# Patient Record
Sex: FEMALE | Race: WHITE | Employment: FULL TIME | URBAN - METROPOLITAN AREA
[De-identification: names, ages, dates, MRNs, and addresses within clinical notes are randomized per-mention and may not be internally consistent; named-entity substitution may affect disease eponyms.]

---

## 2017-05-17 PROBLEM — Z98.84 HX OF BARIATRIC SURGERY: Status: ACTIVE | Noted: 2017-05-17

## 2017-05-17 PROBLEM — R06.02 SHORTNESS OF BREATH: Status: ACTIVE | Noted: 2017-05-17

## 2017-05-17 PROBLEM — Z72.0 TOBACCO ABUSE: Status: ACTIVE | Noted: 2017-05-17

## 2018-01-29 PROBLEM — F17.200 TOBACCO USE DISORDER: Status: ACTIVE | Noted: 2018-01-29

## 2020-12-27 ENCOUNTER — APPOINTMENT (OUTPATIENT)
Dept: GENERAL RADIOLOGY | Age: 50
DRG: 184 | End: 2020-12-27
Payer: COMMERCIAL

## 2020-12-27 ENCOUNTER — HOSPITAL ENCOUNTER (INPATIENT)
Age: 50
LOS: 7 days | Discharge: HOME | DRG: 184 | End: 2021-01-03
Attending: EMERGENCY MEDICINE | Admitting: SURGERY
Payer: COMMERCIAL

## 2020-12-27 ENCOUNTER — APPOINTMENT (OUTPATIENT)
Dept: CT IMAGING | Age: 50
DRG: 184 | End: 2020-12-27
Payer: COMMERCIAL

## 2020-12-27 DIAGNOSIS — S22.49XA TRAUMATIC CLOSED DISPLACED FRACTURE OF MULTIPLE RIBS: ICD-10-CM

## 2020-12-27 DIAGNOSIS — S22.41XA CLOSED FRACTURE OF MULTIPLE RIBS OF RIGHT SIDE, INITIAL ENCOUNTER: ICD-10-CM

## 2020-12-27 DIAGNOSIS — T14.90XA TRAUMA: Primary | ICD-10-CM

## 2020-12-27 LAB
ALBUMIN SERPL-MCNC: 4.5 G/DL (ref 3.5–5.2)
ALP BLD-CCNC: 86 U/L (ref 35–104)
ALT SERPL-CCNC: 21 U/L (ref 0–32)
ANION GAP SERPL CALCULATED.3IONS-SCNC: 9 MMOL/L (ref 7–16)
AST SERPL-CCNC: 31 U/L (ref 0–31)
BILIRUB SERPL-MCNC: 0.2 MG/DL (ref 0–1.2)
BUN BLDV-MCNC: 14 MG/DL (ref 6–20)
CALCIUM SERPL-MCNC: 9.4 MG/DL (ref 8.6–10.2)
CHLORIDE BLD-SCNC: 103 MMOL/L (ref 98–107)
CO2: 29 MMOL/L (ref 22–29)
CREAT SERPL-MCNC: 0.9 MG/DL (ref 0.5–1)
GFR AFRICAN AMERICAN: >60
GFR NON-AFRICAN AMERICAN: >60 ML/MIN/1.73
GLUCOSE BLD-MCNC: 97 MG/DL (ref 74–99)
POTASSIUM REFLEX MAGNESIUM: 4.2 MMOL/L (ref 3.5–5)
SODIUM BLD-SCNC: 141 MMOL/L (ref 132–146)
TOTAL PROTEIN: 6.9 G/DL (ref 6.4–8.3)

## 2020-12-27 PROCEDURE — 6360000002 HC RX W HCPCS: Performed by: SURGERY

## 2020-12-27 PROCEDURE — 6370000000 HC RX 637 (ALT 250 FOR IP): Performed by: EMERGENCY MEDICINE

## 2020-12-27 PROCEDURE — 70450 CT HEAD/BRAIN W/O DYE: CPT

## 2020-12-27 PROCEDURE — 71260 CT THORAX DX C+: CPT

## 2020-12-27 PROCEDURE — 6370000000 HC RX 637 (ALT 250 FOR IP): Performed by: STUDENT IN AN ORGANIZED HEALTH CARE EDUCATION/TRAINING PROGRAM

## 2020-12-27 PROCEDURE — 2000000000 HC ICU R&B

## 2020-12-27 PROCEDURE — 72125 CT NECK SPINE W/O DYE: CPT

## 2020-12-27 PROCEDURE — 72170 X-RAY EXAM OF PELVIS: CPT

## 2020-12-27 PROCEDURE — 96374 THER/PROPH/DIAG INJ IV PUSH: CPT

## 2020-12-27 PROCEDURE — 80053 COMPREHEN METABOLIC PANEL: CPT

## 2020-12-27 PROCEDURE — 6360000002 HC RX W HCPCS: Performed by: EMERGENCY MEDICINE

## 2020-12-27 PROCEDURE — 6360000004 HC RX CONTRAST MEDICATION: Performed by: RADIOLOGY

## 2020-12-27 PROCEDURE — 99223 1ST HOSP IP/OBS HIGH 75: CPT | Performed by: SURGERY

## 2020-12-27 PROCEDURE — 99285 EMERGENCY DEPT VISIT HI MDM: CPT

## 2020-12-27 PROCEDURE — 71045 X-RAY EXAM CHEST 1 VIEW: CPT

## 2020-12-27 PROCEDURE — U0002 COVID-19 LAB TEST NON-CDC: HCPCS

## 2020-12-27 PROCEDURE — 74177 CT ABD & PELVIS W/CONTRAST: CPT

## 2020-12-27 PROCEDURE — 2580000003 HC RX 258: Performed by: RADIOLOGY

## 2020-12-27 RX ORDER — METHOCARBAMOL 500 MG/1
1500 TABLET, FILM COATED ORAL ONCE
Status: COMPLETED | OUTPATIENT
Start: 2020-12-27 | End: 2020-12-27

## 2020-12-27 RX ORDER — 0.9 % SODIUM CHLORIDE 0.9 %
500 INTRAVENOUS SOLUTION INTRAVENOUS ONCE
Status: DISCONTINUED | OUTPATIENT
Start: 2020-12-27 | End: 2020-12-30

## 2020-12-27 RX ORDER — SODIUM CHLORIDE 0.9 % (FLUSH) 0.9 %
10 SYRINGE (ML) INJECTION PRN
Status: COMPLETED | OUTPATIENT
Start: 2020-12-27 | End: 2020-12-27

## 2020-12-27 RX ORDER — OXYCODONE HYDROCHLORIDE 5 MG/1
5 TABLET ORAL EVERY 4 HOURS PRN
Status: DISCONTINUED | OUTPATIENT
Start: 2020-12-27 | End: 2020-12-28

## 2020-12-27 RX ORDER — OXYCODONE HYDROCHLORIDE 10 MG/1
10 TABLET ORAL EVERY 4 HOURS PRN
Status: DISCONTINUED | OUTPATIENT
Start: 2020-12-27 | End: 2020-12-28

## 2020-12-27 RX ORDER — FENTANYL CITRATE 50 UG/ML
100 INJECTION, SOLUTION INTRAMUSCULAR; INTRAVENOUS ONCE
Status: COMPLETED | OUTPATIENT
Start: 2020-12-27 | End: 2020-12-27

## 2020-12-27 RX ADMIN — IOPAMIDOL 100 ML: 755 INJECTION, SOLUTION INTRAVENOUS at 21:12

## 2020-12-27 RX ADMIN — HYDROMORPHONE HYDROCHLORIDE 1 MG: 1 INJECTION, SOLUTION INTRAMUSCULAR; INTRAVENOUS; SUBCUTANEOUS at 23:26

## 2020-12-27 RX ADMIN — OXYCODONE HYDROCHLORIDE 10 MG: 10 TABLET ORAL at 20:41

## 2020-12-27 RX ADMIN — METHOCARBAMOL 1500 MG: 500 TABLET ORAL at 17:08

## 2020-12-27 RX ADMIN — Medication 10 ML: at 21:13

## 2020-12-27 RX ADMIN — FENTANYL CITRATE 100 MCG: 0.05 INJECTION, SOLUTION INTRAMUSCULAR; INTRAVENOUS at 17:08

## 2020-12-27 ASSESSMENT — ENCOUNTER SYMPTOMS
BACK PAIN: 0
SHORTNESS OF BREATH: 0
SORE THROAT: 0
NAUSEA: 0
VOMITING: 0
ABDOMINAL PAIN: 0
EYE PAIN: 0

## 2020-12-27 ASSESSMENT — PAIN SCALES - GENERAL: PAINLEVEL_OUTOF10: 10

## 2020-12-27 NOTE — ED NOTES
Bed: 24  Expected date: 12/27/20  Expected time:   Means of arrival: EMT Ambulance Service  Comments:  Aneta Hennessy Ma, RN  12/27/20 6618

## 2020-12-28 ENCOUNTER — ANESTHESIA (OUTPATIENT)
Dept: SURGICAL ICU | Age: 50
DRG: 184 | End: 2020-12-28
Payer: COMMERCIAL

## 2020-12-28 ENCOUNTER — ANESTHESIA EVENT (OUTPATIENT)
Dept: SURGICAL ICU | Age: 50
DRG: 184 | End: 2020-12-28
Payer: COMMERCIAL

## 2020-12-28 LAB
ALBUMIN SERPL-MCNC: 3.7 G/DL (ref 3.5–5.2)
ALP BLD-CCNC: 78 U/L (ref 35–104)
ALT SERPL-CCNC: 18 U/L (ref 0–32)
ANION GAP SERPL CALCULATED.3IONS-SCNC: 9 MMOL/L (ref 7–16)
AST SERPL-CCNC: 26 U/L (ref 0–31)
BILIRUB SERPL-MCNC: 0.3 MG/DL (ref 0–1.2)
BUN BLDV-MCNC: 13 MG/DL (ref 6–20)
CALCIUM IONIZED: 1.28 MMOL/L (ref 1.15–1.33)
CALCIUM SERPL-MCNC: 8.8 MG/DL (ref 8.6–10.2)
CHLORIDE BLD-SCNC: 104 MMOL/L (ref 98–107)
CO2: 25 MMOL/L (ref 22–29)
CREAT SERPL-MCNC: 0.8 MG/DL (ref 0.5–1)
GFR AFRICAN AMERICAN: >60
GFR NON-AFRICAN AMERICAN: >60 ML/MIN/1.73
GLUCOSE BLD-MCNC: 89 MG/DL (ref 74–99)
HCT VFR BLD CALC: 33.5 % (ref 34–48)
HEMOGLOBIN: 10.6 G/DL (ref 11.5–15.5)
MAGNESIUM: 2.1 MG/DL (ref 1.6–2.6)
MCH RBC QN AUTO: 28.4 PG (ref 26–35)
MCHC RBC AUTO-ENTMCNC: 31.6 % (ref 32–34.5)
MCV RBC AUTO: 89.8 FL (ref 80–99.9)
PDW BLD-RTO: 14.4 FL (ref 11.5–15)
PHOSPHORUS: 3.8 MG/DL (ref 2.5–4.5)
PLATELET # BLD: 279 E9/L (ref 130–450)
PMV BLD AUTO: 11 FL (ref 7–12)
POTASSIUM REFLEX MAGNESIUM: 4 MMOL/L (ref 3.5–5)
RBC # BLD: 3.73 E12/L (ref 3.5–5.5)
SARS-COV-2, NAAT: NOT DETECTED
SODIUM BLD-SCNC: 138 MMOL/L (ref 132–146)
TOTAL PROTEIN: 6.1 G/DL (ref 6.4–8.3)
WBC # BLD: 9.2 E9/L (ref 4.5–11.5)

## 2020-12-28 PROCEDURE — 85027 COMPLETE CBC AUTOMATED: CPT

## 2020-12-28 PROCEDURE — 97535 SELF CARE MNGMENT TRAINING: CPT

## 2020-12-28 PROCEDURE — 2500000003 HC RX 250 WO HCPCS: Performed by: ANESTHESIOLOGY

## 2020-12-28 PROCEDURE — 97166 OT EVAL MOD COMPLEX 45 MIN: CPT

## 2020-12-28 PROCEDURE — 82330 ASSAY OF CALCIUM: CPT

## 2020-12-28 PROCEDURE — 97162 PT EVAL MOD COMPLEX 30 MIN: CPT

## 2020-12-28 PROCEDURE — 6360000002 HC RX W HCPCS: Performed by: STUDENT IN AN ORGANIZED HEALTH CARE EDUCATION/TRAINING PROGRAM

## 2020-12-28 PROCEDURE — 83735 ASSAY OF MAGNESIUM: CPT

## 2020-12-28 PROCEDURE — 2580000003 HC RX 258: Performed by: ANESTHESIOLOGY

## 2020-12-28 PROCEDURE — 94640 AIRWAY INHALATION TREATMENT: CPT

## 2020-12-28 PROCEDURE — 97530 THERAPEUTIC ACTIVITIES: CPT

## 2020-12-28 PROCEDURE — 6370000000 HC RX 637 (ALT 250 FOR IP): Performed by: STUDENT IN AN ORGANIZED HEALTH CARE EDUCATION/TRAINING PROGRAM

## 2020-12-28 PROCEDURE — 80053 COMPREHEN METABOLIC PANEL: CPT

## 2020-12-28 PROCEDURE — 2000000000 HC ICU R&B

## 2020-12-28 PROCEDURE — 84100 ASSAY OF PHOSPHORUS: CPT

## 2020-12-28 PROCEDURE — 6360000002 HC RX W HCPCS: Performed by: ANESTHESIOLOGY

## 2020-12-28 PROCEDURE — 36415 COLL VENOUS BLD VENIPUNCTURE: CPT

## 2020-12-28 PROCEDURE — 99291 CRITICAL CARE FIRST HOUR: CPT | Performed by: SURGERY

## 2020-12-28 PROCEDURE — 94770 HC ETCO2 MONITOR DAILY: CPT

## 2020-12-28 PROCEDURE — 2580000003 HC RX 258: Performed by: STUDENT IN AN ORGANIZED HEALTH CARE EDUCATION/TRAINING PROGRAM

## 2020-12-28 RX ORDER — NALOXONE HYDROCHLORIDE 0.4 MG/ML
0.4 INJECTION, SOLUTION INTRAMUSCULAR; INTRAVENOUS; SUBCUTANEOUS PRN
Status: DISCONTINUED | OUTPATIENT
Start: 2020-12-28 | End: 2021-01-02

## 2020-12-28 RX ORDER — CALCIUM CARBONATE 200(500)MG
500 TABLET,CHEWABLE ORAL 3 TIMES DAILY
Status: DISCONTINUED | OUTPATIENT
Start: 2020-12-28 | End: 2021-01-03 | Stop reason: HOSPADM

## 2020-12-28 RX ORDER — METHOCARBAMOL 750 MG/1
1500 TABLET, FILM COATED ORAL 4 TIMES DAILY
Status: DISCONTINUED | OUTPATIENT
Start: 2020-12-28 | End: 2021-01-03 | Stop reason: HOSPADM

## 2020-12-28 RX ORDER — ALBUTEROL SULFATE 2.5 MG/3ML
2.5 SOLUTION RESPIRATORY (INHALATION) 4 TIMES DAILY
Status: DISCONTINUED | OUTPATIENT
Start: 2020-12-28 | End: 2021-01-03 | Stop reason: HOSPADM

## 2020-12-28 RX ORDER — ONDANSETRON 2 MG/ML
4 INJECTION INTRAMUSCULAR; INTRAVENOUS EVERY 6 HOURS PRN
Status: DISCONTINUED | OUTPATIENT
Start: 2020-12-28 | End: 2021-01-03 | Stop reason: HOSPADM

## 2020-12-28 RX ORDER — SODIUM CHLORIDE 0.9 % (FLUSH) 0.9 %
10 SYRINGE (ML) INJECTION EVERY 12 HOURS SCHEDULED
Status: DISCONTINUED | OUTPATIENT
Start: 2020-12-28 | End: 2021-01-03 | Stop reason: HOSPADM

## 2020-12-28 RX ORDER — NICOTINE 21 MG/24HR
1 PATCH, TRANSDERMAL 24 HOURS TRANSDERMAL DAILY
Status: DISCONTINUED | OUTPATIENT
Start: 2020-12-28 | End: 2021-01-03 | Stop reason: HOSPADM

## 2020-12-28 RX ORDER — METHOCARBAMOL 500 MG/1
1000 TABLET, FILM COATED ORAL 4 TIMES DAILY
Status: DISCONTINUED | OUTPATIENT
Start: 2020-12-28 | End: 2020-12-28

## 2020-12-28 RX ORDER — LANOLIN ALCOHOL/MO/W.PET/CERES
1000 CREAM (GRAM) TOPICAL WEEKLY
Status: DISCONTINUED | OUTPATIENT
Start: 2021-01-03 | End: 2021-01-03 | Stop reason: HOSPADM

## 2020-12-28 RX ORDER — POLYETHYLENE GLYCOL 3350 17 G/17G
17 POWDER, FOR SOLUTION ORAL DAILY
Status: DISCONTINUED | OUTPATIENT
Start: 2020-12-28 | End: 2021-01-03 | Stop reason: HOSPADM

## 2020-12-28 RX ORDER — LIDOCAINE 4 G/G
1 PATCH TOPICAL DAILY
Status: DISCONTINUED | OUTPATIENT
Start: 2020-12-28 | End: 2021-01-03 | Stop reason: HOSPADM

## 2020-12-28 RX ORDER — SENNA AND DOCUSATE SODIUM 50; 8.6 MG/1; MG/1
1 TABLET, FILM COATED ORAL 2 TIMES DAILY
Status: DISCONTINUED | OUTPATIENT
Start: 2020-12-28 | End: 2021-01-03 | Stop reason: HOSPADM

## 2020-12-28 RX ORDER — GABAPENTIN 100 MG/1
100 CAPSULE ORAL 3 TIMES DAILY
Status: DISCONTINUED | OUTPATIENT
Start: 2020-12-28 | End: 2021-01-01

## 2020-12-28 RX ORDER — ACETAMINOPHEN 500 MG
1000 TABLET ORAL EVERY 6 HOURS
Status: DISCONTINUED | OUTPATIENT
Start: 2020-12-28 | End: 2021-01-03 | Stop reason: HOSPADM

## 2020-12-28 RX ORDER — KETOROLAC TROMETHAMINE 30 MG/ML
15 INJECTION, SOLUTION INTRAMUSCULAR; INTRAVENOUS EVERY 6 HOURS PRN
Status: DISCONTINUED | OUTPATIENT
Start: 2020-12-28 | End: 2020-12-28

## 2020-12-28 RX ORDER — PANTOPRAZOLE SODIUM 40 MG/1
40 TABLET, DELAYED RELEASE ORAL
Status: DISCONTINUED | OUTPATIENT
Start: 2020-12-28 | End: 2021-01-03 | Stop reason: HOSPADM

## 2020-12-28 RX ORDER — SODIUM CHLORIDE, SODIUM LACTATE, POTASSIUM CHLORIDE, CALCIUM CHLORIDE 600; 310; 30; 20 MG/100ML; MG/100ML; MG/100ML; MG/100ML
INJECTION, SOLUTION INTRAVENOUS CONTINUOUS
Status: DISCONTINUED | OUTPATIENT
Start: 2020-12-28 | End: 2020-12-29

## 2020-12-28 RX ORDER — SODIUM CHLORIDE 0.9 % (FLUSH) 0.9 %
10 SYRINGE (ML) INJECTION PRN
Status: DISCONTINUED | OUTPATIENT
Start: 2020-12-28 | End: 2021-01-03 | Stop reason: HOSPADM

## 2020-12-28 RX ORDER — NICOTINE 21 MG/24HR
1 PATCH, TRANSDERMAL 24 HOURS TRANSDERMAL DAILY
Status: CANCELLED | OUTPATIENT
Start: 2020-12-28

## 2020-12-28 RX ADMIN — METHOCARBAMOL TABLETS 1500 MG: 750 TABLET, COATED ORAL at 08:05

## 2020-12-28 RX ADMIN — METHOCARBAMOL TABLETS 1500 MG: 750 TABLET, COATED ORAL at 17:17

## 2020-12-28 RX ADMIN — SENNOSIDES AND DOCUSATE SODIUM 1 TABLET: 8.6; 5 TABLET ORAL at 02:04

## 2020-12-28 RX ADMIN — GABAPENTIN 100 MG: 100 CAPSULE ORAL at 08:05

## 2020-12-28 RX ADMIN — ACETAMINOPHEN 1000 MG: 500 TABLET ORAL at 20:25

## 2020-12-28 RX ADMIN — PANTOPRAZOLE SODIUM 40 MG: 40 TABLET, DELAYED RELEASE ORAL at 08:06

## 2020-12-28 RX ADMIN — ACETAMINOPHEN 1000 MG: 500 TABLET ORAL at 02:04

## 2020-12-28 RX ADMIN — POLYETHYLENE GLYCOL 3350 17 G: 17 POWDER, FOR SOLUTION ORAL at 08:05

## 2020-12-28 RX ADMIN — GABAPENTIN 100 MG: 100 CAPSULE ORAL at 13:56

## 2020-12-28 RX ADMIN — Medication 10 ML: at 20:25

## 2020-12-28 RX ADMIN — SENNOSIDES AND DOCUSATE SODIUM 1 TABLET: 8.6; 5 TABLET ORAL at 08:05

## 2020-12-28 RX ADMIN — SODIUM CHLORIDE, POTASSIUM CHLORIDE, SODIUM LACTATE AND CALCIUM CHLORIDE: 600; 310; 30; 20 INJECTION, SOLUTION INTRAVENOUS at 10:58

## 2020-12-28 RX ADMIN — CALCIUM CARBONATE 500 MG: 500 TABLET, CHEWABLE ORAL at 14:00

## 2020-12-28 RX ADMIN — SODIUM CHLORIDE, POTASSIUM CHLORIDE, SODIUM LACTATE AND CALCIUM CHLORIDE: 600; 310; 30; 20 INJECTION, SOLUTION INTRAVENOUS at 01:46

## 2020-12-28 RX ADMIN — ALBUTEROL SULFATE 2.5 MG: 2.5 SOLUTION RESPIRATORY (INHALATION) at 16:55

## 2020-12-28 RX ADMIN — ACETAMINOPHEN 1000 MG: 500 TABLET ORAL at 13:56

## 2020-12-28 RX ADMIN — GABAPENTIN 100 MG: 100 CAPSULE ORAL at 20:25

## 2020-12-28 RX ADMIN — HYDROMORPHONE HYDROCHLORIDE 1 MG: 1 INJECTION, SOLUTION INTRAMUSCULAR; INTRAVENOUS; SUBCUTANEOUS at 01:46

## 2020-12-28 RX ADMIN — ACETAMINOPHEN 1000 MG: 500 TABLET ORAL at 08:05

## 2020-12-28 RX ADMIN — Medication: at 01:51

## 2020-12-28 RX ADMIN — ONDANSETRON HYDROCHLORIDE 4 MG: 2 INJECTION, SOLUTION INTRAMUSCULAR; INTRAVENOUS at 05:27

## 2020-12-28 RX ADMIN — METHOCARBAMOL TABLETS 1500 MG: 750 TABLET, COATED ORAL at 20:26

## 2020-12-28 RX ADMIN — Medication 10 ML: at 08:06

## 2020-12-28 RX ADMIN — CALCIUM CARBONATE 500 MG: 500 TABLET, CHEWABLE ORAL at 10:00

## 2020-12-28 RX ADMIN — ALBUTEROL SULFATE 2.5 MG: 2.5 SOLUTION RESPIRATORY (INHALATION) at 21:01

## 2020-12-28 RX ADMIN — SODIUM CHLORIDE, POTASSIUM CHLORIDE, SODIUM LACTATE AND CALCIUM CHLORIDE: 600; 310; 30; 20 INJECTION, SOLUTION INTRAVENOUS at 20:28

## 2020-12-28 RX ADMIN — CALCIUM CARBONATE 500 MG: 500 TABLET, CHEWABLE ORAL at 20:26

## 2020-12-28 RX ADMIN — ALBUTEROL SULFATE 2.5 MG: 2.5 SOLUTION RESPIRATORY (INHALATION) at 09:30

## 2020-12-28 RX ADMIN — METHOCARBAMOL TABLETS 1500 MG: 750 TABLET, COATED ORAL at 13:55

## 2020-12-28 RX ADMIN — OXYCODONE HYDROCHLORIDE 10 MG: 10 TABLET ORAL at 00:56

## 2020-12-28 RX ADMIN — SENNOSIDES AND DOCUSATE SODIUM 1 TABLET: 8.6; 5 TABLET ORAL at 20:26

## 2020-12-28 RX ADMIN — FENTANYL CITRATE: 0.05 INJECTION, SOLUTION INTRAMUSCULAR; INTRAVENOUS at 09:03

## 2020-12-28 ASSESSMENT — PAIN SCALES - GENERAL
PAINLEVEL_OUTOF10: 0
PAINLEVEL_OUTOF10: 10
PAINLEVEL_OUTOF10: 7
PAINLEVEL_OUTOF10: 1

## 2020-12-28 ASSESSMENT — PAIN DESCRIPTION - PAIN TYPE: TYPE: ACUTE PAIN

## 2020-12-28 ASSESSMENT — PAIN - FUNCTIONAL ASSESSMENT: PAIN_FUNCTIONAL_ASSESSMENT: PREVENTS OR INTERFERES WITH MANY ACTIVE NOT PASSIVE ACTIVITIES

## 2020-12-28 ASSESSMENT — PAIN DESCRIPTION - LOCATION
LOCATION: RIB CAGE
LOCATION: RIB CAGE

## 2020-12-28 ASSESSMENT — PAIN DESCRIPTION - PROGRESSION: CLINICAL_PROGRESSION: NOT CHANGED

## 2020-12-28 ASSESSMENT — PAIN DESCRIPTION - ONSET: ONSET: ON-GOING

## 2020-12-28 NOTE — PROGRESS NOTES
Rosalinaruba Kan was ordered Vyvanse which is a nonformulary medication. The patient will need to have their home supply of this medication brought in to the hospital for inpatient use. If the medication has not been administered by 1400 on the following day from the time the order was placed, a pharmacist will follow-up with the nurse of the patient to assess the capability of the patient to bring in the medication. If it is determined that the patient cannot supply the medication and it is not available to be dispensed from the pharmacy, a call will be placed to the ordering provider to discuss alternative options.

## 2020-12-28 NOTE — PLAN OF CARE
Problem: Pain:  Goal: Pain level will decrease  Description: Pain level will decrease  Outcome: Met This Shift  Goal: Control of acute pain  Description: Control of acute pain  12/28/2020 1028 by Sofy Page RN  Outcome: Met This Shift  12/28/2020 0403 by Chris Ryan  Outcome: Met This Shift  Goal: Control of chronic pain  Description: Control of chronic pain  Outcome: Met This Shift     Problem: Skin Integrity:  Goal: Will show no infection signs and symptoms  Description: Will show no infection signs and symptoms  12/28/2020 1028 by Sofy Page RN  Outcome: Met This Shift  12/28/2020 0403 by Chris Ryan  Outcome: Met This Shift  Goal: Absence of new skin breakdown  Description: Absence of new skin breakdown  Outcome: Met This Shift     Problem: Falls - Risk of:  Goal: Will remain free from falls  Description: Will remain free from falls  12/28/2020 1028 by Sofy Pgae RN  Outcome: Met This Shift  12/28/2020 0403 by Chris Ryan  Outcome: Met This Shift  Goal: Absence of physical injury  Description: Absence of physical injury  12/28/2020 1028 by Sofy Page RN  Outcome: Met This Shift  12/28/2020 0403 by Chris Ryan  Outcome: Met This Shift

## 2020-12-28 NOTE — PROGRESS NOTES
Patient belongings at bedside. Includes purse that contains multiple credit cards, along with many other personal items of value. 2 bags containing boots, underwear, and other clothing at bedside. Medications taken and locked with security. Medications: Omeprazole, Veniafaxine, Tramadol, and Excedrin.

## 2020-12-28 NOTE — DISCHARGE SUMMARY
Physician Discharge Summary     Patient ID:  Radha Arreola  [id number]  48 y.o.  1970    Admit date: 12/27/2020    Discharge date and time: 1/3/2021  3:06 PM     Admitting Physician: [place] MD     Admission Diagnoses: Traumatic closed displaced fracture of multiple ribs [S22.49XA]    Discharge Diagnoses: Active Problems:    Traumatic closed displaced fracture of multiple ribs  Resolved Problems:    * No resolved hospital problems. *      Admission Condition: stable    Discharged Condition: stable    Indication for Admission: Rib Fractures    Hospital Course/Procedures/Operation/treatments:   12/27 -- Trauma transfer/consult. Fall on ice. Sustained right sided rib fractures ribs 3-8. History of fibromyalgia on chronic pain medications. Admitted to SICU for poor [facility] and pain control  12/28 -- Inability to achieve adequate pain control despite multimodal therapy and dilaudid PCA. Will ask anesthesia to place epidural. Urinary retention overnight, harris placed  12/29 -- Pain control improved with Epidural, stopped PCA. [facility] slightly improved but poor effort, about 750cc. Tolerating PO. Adequate UOP, stopped IVF. 12/30 -- Dysuria overnight, UA sent. Pain and SMI both improving, 1000cc  1/1: Patient eager for discharge. SMI 1500. Would like her epidural removed today. Lovenox held overnight in preparation for this. 1/2: Epidural removed yesterday, states pain is much worse today, SMI ~500 this morning. Continue aggressive pulmonary hygiene. Patient states she has not had a BM since admission, will increase bowel regimen. 1/3: States pain has improved this morning, . Asking if she is ready to go home. Discharge planning            Consults:   IP CONSULT TO TRAUMA SURGERY  IP CONSULT TO ANESTHESIOLOGY    Significant Diagnostic Studies:   Xr Pelvis (1-2 Views)    Result Date: 12/27/2020  EXAMINATION: ONE XRAY VIEW OF THE PELVIS 12/27/2020 6:33 pm COMPARISON: None.  HISTORY: ORDERING SYSTEM PROVIDED HISTORY: trauma TECHNOLOGIST PROVIDED HISTORY: Reason for exam:->trauma What reading provider will be dictating this exam?->CRC FINDINGS: Degenerative changes of bilateral hips right greater than left. Osteophytes along the right femoral neck and subchondral sclerosis along the right acetabulum. No evidence of acute fracture or dislocation. The pubic rami are intact. Sacrum within normal limits. Degenerative changes of bilateral hips right greater than left. No evidence of acute pelvic or hip fracture. Ct Head Wo Contrast    Result Date: 12/27/2020  EXAMINATION: CT OF THE HEAD WITHOUT CONTRAST  12/27/2020 8:11 pm TECHNIQUE: CT of the head was performed without the administration of intravenous contrast. Dose modulation, iterative reconstruction, and/or weight based adjustment of the mA/kV was utilized to reduce the radiation dose to as low as reasonably achievable. COMPARISON: None. HISTORY: ORDERING SYSTEM PROVIDED HISTORY: trauma TECHNOLOGIST PROVIDED HISTORY: Reason for exam:->trauma Has a \"code stroke\" or \"stroke alert\" been called? ->No What reading provider will be dictating this exam?->CRC FINDINGS: BRAIN/VENTRICLES: There is no acute intracranial hemorrhage, mass effect or midline shift. No abnormal extra-axial fluid collection. The gray-white differentiation is maintained without evidence of an acute infarct. There is no evidence of hydrocephalus. ORBITS: The visualized portion of the orbits demonstrate no acute abnormality. SINUSES: The visualized paranasal sinuses and mastoid air cells demonstrate no acute abnormality. SOFT TISSUES/SKULL:  No acute abnormality of the visualized skull or soft tissues. No acute intracranial abnormality.     Ct Chest W Contrast    Result Date: 12/27/2020  EXAMINATION: CT OF THE CHEST WITH CONTRAST; CT OF THE ABDOMEN AND PELVIS WITH CONTRAST 12/27/2020 9:11 pm TECHNIQUE: CT of the chest was performed with the administration of intravenous contrast. Multiplanar reformatted images are provided for review. Dose modulation, iterative reconstruction, and/or weight based adjustment of the mA/kV was utilized to reduce the radiation dose to as low as reasonably achievable.; CT of the abdomen and pelvis was performed with the administration of intravenous contrast. Multiplanar reformatted images are provided for review. Dose modulation, iterative reconstruction, and/or weight based adjustment of the mA/kV was utilized to reduce the radiation dose to as low as reasonably achievable. COMPARISON: None. HISTORY: ORDERING SYSTEM PROVIDED HISTORY: trauma TECHNOLOGIST PROVIDED HISTORY: Reason for exam:->trauma What reading provider will be dictating this exam?->CRC FINDINGS: CT chest. The heart and the great vessels are normal.  The trachea and major bronchi are patent. No enlarged mediastinal or hilar lymph nodes are present. There is atelectasis/ground-glass opacities in the lower lobes bilaterally and right middle lobe which may represent atelectasis/contusions and or pneumonia. Fractures of the right 4th through 9th ribs laterally and posteriorly are identified without pneumothorax. Degenerative changes are identified in the thoracic spine. CT abdomen and pelvis. There is hepatomegaly with liver measuring 18 cm which is fatty. Gallbladder is distended. Surgical changes are identified in the stomach. Spleen, pancreas, the adrenals and the kidneys are normal.  Degenerative changes are identified in the lumbar spine. Pelvis. The bladder is distended. There is diverticulosis of the colon without diverticulitis. The appendix is normal.    Multiple right rib fractures from 4 through 9th posteriorly and laterally without pneumothorax. Atelectasis/ground-glass opacities in the lung bases which may be due to contusions and or pneumonia. There is no solid organ injury or acute traumatic injury in the abdomen and pelvis.     Ct Cervical Spine Wo Contrast    Result Date: 12/27/2020  EXAMINATION: CT OF THE CERVICAL SPINE WITHOUT CONTRAST 12/27/2020 8:11 pm TECHNIQUE: CT of the cervical spine was performed without the administration of intravenous contrast. Multiplanar reformatted images are provided for review. Dose modulation, iterative reconstruction, and/or weight based adjustment of the mA/kV was utilized to reduce the radiation dose to as low as reasonably achievable. COMPARISON: None. HISTORY: ORDERING SYSTEM PROVIDED HISTORY: trauma TECHNOLOGIST PROVIDED HISTORY: Reason for exam:->trauma What reading provider will be dictating this exam?->CRC FINDINGS: BONES/ALIGNMENT: There is no acute fracture or traumatic malalignment. DEGENERATIVE CHANGES: There are mild degenerative changes of the cervical spine with multilevel facet joint arthropathy, narrowing of intervertebral disc spaces and disc osteophyte complexes. SOFT TISSUES: There is no prevertebral soft tissue swelling. No acute cervical spine fracture or malalignment. Degenerative changes of the cervical spine. Ct Abdomen Pelvis W Iv Contrast Additional Contrast? None    Result Date: 12/27/2020  EXAMINATION: CT OF THE CHEST WITH CONTRAST; CT OF THE ABDOMEN AND PELVIS WITH CONTRAST 12/27/2020 9:11 pm TECHNIQUE: CT of the chest was performed with the administration of intravenous contrast. Multiplanar reformatted images are provided for review. Dose modulation, iterative reconstruction, and/or weight based adjustment of the mA/kV was utilized to reduce the radiation dose to as low as reasonably achievable.; CT of the abdomen and pelvis was performed with the administration of intravenous contrast. Multiplanar reformatted images are provided for review. Dose modulation, iterative reconstruction, and/or weight based adjustment of the mA/kV was utilized to reduce the radiation dose to as low as reasonably achievable. COMPARISON: None.  HISTORY: ORDERING SYSTEM PROVIDED HISTORY: trauma TECHNOLOGIST PROVIDED HISTORY: Reason for exam:->trauma What reading provider will be dictating this exam?->CRC FINDINGS: CT chest. The heart and the great vessels are normal.  The trachea and major bronchi are patent. No enlarged mediastinal or hilar lymph nodes are present. There is atelectasis/ground-glass opacities in the lower lobes bilaterally and right middle lobe which may represent atelectasis/contusions and or pneumonia. Fractures of the right 4th through 9th ribs laterally and posteriorly are identified without pneumothorax. Degenerative changes are identified in the thoracic spine. CT abdomen and pelvis. There is hepatomegaly with liver measuring 18 cm which is fatty. Gallbladder is distended. Surgical changes are identified in the stomach. Spleen, pancreas, the adrenals and the kidneys are normal.  Degenerative changes are identified in the lumbar spine. Pelvis. The bladder is distended. There is diverticulosis of the colon without diverticulitis. The appendix is normal.    Multiple right rib fractures from 4 through 9th posteriorly and laterally without pneumothorax. Atelectasis/ground-glass opacities in the lung bases which may be due to contusions and or pneumonia. There is no solid organ injury or acute traumatic injury in the abdomen and pelvis. Xr Chest Portable    Result Date: 12/27/2020  EXAMINATION: ONE XRAY VIEW OF THE CHEST 12/27/2020 5:30 pm COMPARISON: None. HISTORY: ORDERING SYSTEM PROVIDED HISTORY: known right rib FX TECHNOLOGIST PROVIDED HISTORY: Reason for exam:->known right rib FX What reading provider will be dictating this exam?->CRC FINDINGS: Heart size is normal.  There is atelectasis in the lung bases. The rib fractures are poorly delineated. There is no pneumothorax. Atelectasis in lung bases. There is no focal consolidation or pneumothorax.       Discharge Exam:  Gen - no apparent distress   Neuro - Awake, alert, attentive    HEENT - PERRL 3mm conj pink   Lungs - non labored, BS clear b/l    Heart - RR no extra heart sounds    Abdomen - SNT   Spine -   Non tender C/T/L  Ext- rom wnl NVI      Disposition: home    In process/preliminary results:  Outstanding Order Results     No orders found from 11/28/2020 to 12/28/2020. Patient Instructions:   Discharge Medication List as of 1/3/2021  1:00 PM           Details   oxyCODONE (ROXICODONE) 5 MG immediate release tablet Take 1 tablet by mouth every 6 hours as needed for Pain for up to 7 days. , Disp-28 tablet, R-0Normal      lidocaine 4 % external patch Place 1 patch onto the skin daily for 14 days, Transdermal, DAILY Starting Mon 1/4/2021, Until Mon 1/18/2021, For 14 days, Disp-7 patch, R-1, Normal      nitrofurantoin, macrocrystal-monohydrate, (MACROBID) 100 MG capsule Take 1 capsule by mouth every 12 hours for 2 days, Disp-4 capsule, R-0Normal      polyethylene glycol (GLYCOLAX) 17 g packet Take 17 g by mouth daily, Disp-527 g, R-1Normal      methocarbamol (ROBAXIN) 750 MG tablet Take 2 tablets by mouth 4 times daily for 10 days, Disp-80 tablet, R-0Normal              Details   varenicline (CHANTIX CONTINUING MONTH DREA) 1 MG tablet Take 1 tablet by mouth 2 times daily, Disp-60 tablet, R-1Normal      vitamin D (ERGOCALCIFEROL) 17616 units CAPS capsule Take 1 capsule by mouth once a week, Disp-12 capsule, R-0Normal      VYVANSE 60 MG CAPS take 1 capsule by mouth every morning, R-0, DAWHistorical Med      LYRICA 100 MG capsule take 1 capsule by mouth at bedtime, R-0, DAWHistorical Med      varenicline (CHANTIX STARTING MONTH DREA) 0.5 MG X 11 & 1 MG X 42 tablet Take by mouth., Disp-1 each, R-0Normal      omeprazole (PRILOSEC) 40 MG delayed release capsule take 1 capsule by mouth once daily, Disp-30 capsule, R-0      PROAIR  (90 BASE) MCG/ACT inhaler Inhale 2 puffs into the lungs every 4 hours as needed , R-0, ALBERTO      Multiple Vitamins-Iron (MULTIVITAMIN/IRON PO) Take by mouth daily Indications: supplement      vitamin B-12 (CYANOCOBALAMIN) 1000 MCG tablet Place 1,000 mcg under the tongue once a week Indications: Supplement      calcium carbonate (OSCAL) 500 MG TABS tablet Take 500 mg by mouth 3 times daily Indications: Supplement      traMADol (ULTRAM) 50 MG tablet Take 50 mg by mouth every 8 hours as needed , R-0             RIB FRACTURE DISCHARGE INSTRUCTIONS    Your ribs are curved bones in your chest that protect inner structures like your lungs and heart. The ribs expand and contract when you breathe. Pain can result making it hard to cough or breathe deeply. The difficulty increases if several ribs are broken on both sides. You are likely to heal in 6 to 8 weeks, but may take longer if you are elderly. Most rib fractures heal on their own with no lasting effects. Treatment:    Take the medications given to you as prescribed. Your pain may not go completely away after discharge from the hospital, but we want your pain tolerable so you can take deep breaths.  As your pain becomes controlled you may switch to taking over-the-counter medications such as Tylenol and or Ibuprofen as directed. o Tylenol (acetaminophen) 1000mg every 6 hours or you may take 650mg every 4 hours. o Ibuprofen up to 800mg every 8 hours. (Please take with food or milk).  Over the counter creams and patches such as Salon Pas, [employer], Aspercream, can be useful as well.  You were likely given a breathing device called an incentive spirometer while in the hospital to practice deep breathing. It is advised to continue this several times a day while at home to prevent pneumonia. Prevent Complications:  Try to take 5-10 deep breaths every hour while awake. Use the incentive spirometer often. Set goals of deeper breathing every couple of days until reaching normal adult level of about 2500 ml on the printed scale. Activity:  Avoid further chest injury. Plan quiet activity for the first few days. Do not stay in bed. Walk around and move.   No rough activities with family, friends or pets. No sports, jumping, jogging or gymnastics. Ask your doctor or the trauma clinic when you will be able to resume these activities. Symptoms to Report:  Call your doctor right away if you notice any of these symptoms:    Increased chest pain   Shortness of breath   Fever   Coughing up blood    Call 911 immediately if these symptoms are severe.     Follow-up:  Trauma Clinic: [phone] option 2  [facility]  [address] [address], [address] [place]       Follow up:   [address]  5 [facility] [id number]  In 1 week  For routine follow up, rib fractures       Signed:  Gini Adhikari MD  1/14/2021  11:01 AM

## 2020-12-28 NOTE — ANESTHESIA PROCEDURE NOTES
Epidural Block    Patient location during procedure: ICU  Start time: 12/28/2020 8:54 AM  End time: 12/28/2020 9:03 AM  Reason for block: procedure for pain  Staffing  Performed: anesthesiologist   Anesthesiologist: Milvia Hutton DO  Preanesthetic Checklist  Completed: patient identified, IV checked, site marked, risks and benefits discussed, surgical consent, monitors and equipment checked, pre-op evaluation, timeout performed, anesthesia consent given, oxygen available and patient being monitored  Epidural  Patient position: sitting  Prep: ChloraPrep  Patient monitoring: cardiac monitor, continuous pulse ox and frequent blood pressure checks  Approach: right paramedian  Location: thoracic (1-12)  Injection technique: GAMALIEL saline  Provider prep: mask and sterile gloves  Needle  Needle type: Tuohy   Needle gauge: 18 G  Needle length: 2.5 in  Catheter type: end hole  Catheter size: 20 G.   Test dose: negative  Assessment  Sensory level: T4  Hemodynamics: stable  Attempts: 1

## 2020-12-28 NOTE — CARE COORDINATION
Cm transition of care: Met with pt at bedside. She lives with her s.o. Simon De Souza and her 2 children in a 2 story home. There is a full bath and bedroom on both floors. Pt is independent in adl's. 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  She does not have a PCP and is agreeable to being set up with a [facility] - [address] , either in the [facility] or [facility] area. She uses [facility]. Pt reports that [facility] has [facility] [employer] as primary and [employer] secondary. E mail sent to centralized intake to request verification. Plan at this time is to return home. Await PT/OT recommendations for dc needs.

## 2020-12-28 NOTE — ED PROVIDER NOTES
Patient is a 70-year-old female presented emergency department as transfer from [facility] due to a fall that occurred earlier today. She states that she was getting into a vehicle and it was by a curb and slipped and fell on her right side and started having sudden pain on the right side of her chest/flank area. Patient describes the pain 10 out of 10 in severity sharp in nature worse with breathing. Patient states and per EMS and paperwork patient had findings of multiple rib fractures on the right. Patient planes of pain in the area of the reported rib fractures. She states the pain is worse on palpation and breathing is mildly improved with pain medications however at this point upon arrival is not adequately controlled. Patient denies any loss of consciousness, neck pain, back pain, nausea, vomiting, lightheadedness    The history is provided by the patient and medical records. No  was used. Review of Systems   Constitutional: Negative for chills and fever. HENT: Negative for ear pain and sore throat. Eyes: Negative for pain. Respiratory: Negative for shortness of breath. Cardiovascular: Positive for chest pain (Right side). Gastrointestinal: Negative for abdominal pain, nausea and vomiting. Genitourinary: Negative for flank pain and pelvic pain. Musculoskeletal: Negative for back pain and neck pain. Skin: Negative for rash. Neurological: Negative for headaches. Physical Exam  Vitals signs and nursing note reviewed. Constitutional:       General: She is not in acute distress. Appearance: She is well-developed. She is obese. She is ill-appearing. HENT:      Head: Normocephalic and atraumatic. Right Ear: External ear normal.      Left Ear: External ear normal.      Nose: Nose normal.      Mouth/Throat:      Mouth: Mucous membranes are moist.      Pharynx: Oropharynx is clear.    Eyes:      Pupils: Pupils are equal, round, and reactive to light. Neck:      Musculoskeletal: Normal range of motion and neck supple. Cardiovascular:      Rate and Rhythm: Normal rate and regular rhythm. Heart sounds: Normal heart sounds. Pulmonary:      Effort: Pulmonary effort is normal. No respiratory distress. Breath sounds: Normal breath sounds. Chest:      Chest wall: Tenderness (Right-sided) present. No crepitus or edema. Abdominal:      Palpations: Abdomen is soft. Tenderness: There is no abdominal tenderness. Musculoskeletal:         General: No swelling or tenderness. Skin:     General: Skin is warm and dry. Findings: No rash. Neurological:      Mental Status: She is alert and oriented to person, place, and time. Cranial Nerves: No cranial nerve deficit. Procedures         MDM              --------------------------------------------- PAST HISTORY ---------------------------------------------  Past Medical History:  has a past medical history of ADHD (attention deficit hyperactivity disorder), Anemia, Anxiety, Arthritis, Asthma, Bleeding ulcer, Carpal tunnel syndrome, Degenerative arthritis, Fibromyalgia, and GERD (gastroesophageal reflux disease). Past Surgical History:  has a past surgical history that includes Tonsillectomy; lipectomy; cyst removal; Norma-en-Y Gastric Bypass (12/1999); Norma-en-Y Gastric Bypass (4/2012); Dilation and curettage of uterus (1991); Tubal ligation (2003); Upper gastrointestinal endoscopy; and Hand surgery (Right, 03/2016). Social History:  reports that she has been smoking cigarettes. She has been smoking about 1.00 pack per day. She has never used smokeless tobacco. She reports current alcohol use. She reports that she does not use drugs. Family History: family history includes Cancer in her father and maternal grandmother; Diabetes in her father, mother, and paternal grandfather; Hypertension in her father; Stroke in her mother and paternal grandmother.      The patients home medications have been reviewed.     Allergies: Ciprofloxacin, Clindamycin/lincomycin, Erythromycin, and Keflex [cephalexin]    -------------------------------------------------- RESULTS -------------------------------------------------    LABS:  Results for orders placed or performed during the hospital encounter of 12/27/20   Comprehensive Metabolic Panel w/ Reflex to MG   Result Value Ref Range    Sodium 141 132 - 146 mmol/L    Potassium reflex Magnesium 4.2 3.5 - 5.0 mmol/L    Chloride 103 98 - 107 mmol/L    CO2 29 22 - 29 mmol/L    Anion Gap 9 7 - 16 mmol/L    Glucose 97 74 - 99 mg/dL    BUN 14 6 - 20 mg/dL    CREATININE 0.9 0.5 - 1.0 mg/dL    GFR Non-African American >60 >=60 mL/min/1.73    GFR African American >60     Calcium 9.4 8.6 - 10.2 mg/dL    Total Protein 6.9 6.4 - 8.3 g/dL    Alb 4.5 3.5 - 5.2 g/dL    Total Bilirubin 0.2 0.0 - 1.2 mg/dL    Alkaline Phosphatase 86 35 - 104 U/L    ALT 21 0 - 32 U/L    AST 31 0 - 31 U/L   COVID-19   Result Value Ref Range    SARS-CoV-2, NAAT Not Detected Not Detected   CBC   Result Value Ref Range    WBC 9.2 4.5 - 11.5 E9/L    RBC 3.73 3.50 - 5.50 E12/L    Hemoglobin 10.6 (L) 11.5 - 15.5 g/dL    Hematocrit 33.5 (L) 34.0 - 48.0 %    MCV 89.8 80.0 - 99.9 fL    MCH 28.4 26.0 - 35.0 pg    MCHC 31.6 (L) 32.0 - 34.5 %    RDW 14.4 11.5 - 15.0 fL    Platelets 397 825 - 336 E9/L    MPV 11.0 7.0 - 12.0 fL   Comprehensive Metabolic Panel w/ Reflex to MG   Result Value Ref Range    Sodium 138 132 - 146 mmol/L    Potassium reflex Magnesium 4.0 3.5 - 5.0 mmol/L    Chloride 104 98 - 107 mmol/L    CO2 25 22 - 29 mmol/L    Anion Gap 9 7 - 16 mmol/L    Glucose 89 74 - 99 mg/dL    BUN 13 6 - 20 mg/dL    CREATININE 0.8 0.5 - 1.0 mg/dL    GFR Non-African American >60 >=60 mL/min/1.73    GFR African American >60     Calcium 8.8 8.6 - 10.2 mg/dL    Total Protein 6.1 (L) 6.4 - 8.3 g/dL    Alb 3.7 3.5 - 5.2 g/dL    Total Bilirubin 0.3 0.0 - 1.2 mg/dL    Alkaline Phosphatase 78 35 - 104 U/L ALT 18 0 - 32 U/L    AST 26 0 - 31 U/L   Magnesium   Result Value Ref Range    Magnesium 2.1 1.6 - 2.6 mg/dL   Calcium, ionized   Result Value Ref Range    Calcium, Ion 1.28 1.15 - 1.33 mmol/L   Phosphorus   Result Value Ref Range    Phosphorus 3.8 2.5 - 4.5 mg/dL       RADIOLOGY:  CT HEAD WO CONTRAST   Final Result   No acute intracranial abnormality. CT CERVICAL SPINE WO CONTRAST   Final Result   No acute cervical spine fracture or malalignment. Degenerative changes of the cervical spine. CT CHEST W CONTRAST   Final Result   Multiple right rib fractures from 4 through 9th posteriorly and laterally   without pneumothorax. Atelectasis/ground-glass opacities in the lung bases which may be due to   contusions and or pneumonia. There is no solid organ injury or acute traumatic injury in the abdomen and   pelvis. CT ABDOMEN PELVIS W IV CONTRAST Additional Contrast? None   Final Result   Multiple right rib fractures from 4 through 9th posteriorly and laterally   without pneumothorax. Atelectasis/ground-glass opacities in the lung bases which may be due to   contusions and or pneumonia. There is no solid organ injury or acute traumatic injury in the abdomen and   pelvis. XR PELVIS (1-2 VIEWS)   Final Result   Degenerative changes of bilateral hips right greater than left. No evidence of acute pelvic or hip fracture. XR CHEST PORTABLE   Final Result   Atelectasis in lung bases. There is no focal consolidation or pneumothorax.               ------------------------- NURSING NOTES AND VITALS REVIEWED ---------------------------  Date / Time Roomed:  12/27/2020  4:46 PM  ED Bed Assignment:  3805/3805-A    The nursing notes within the ED encounter and vital signs as below have been reviewed.      Patient Vitals for the past 24 hrs:   BP Temp Temp src Pulse Resp SpO2 Height Weight   12/28/20 1655 -- -- -- -- 15 99 % -- --   12/28/20 1600 106/72 98.5 °F (36.9 °C) Axillary 92 20 96 % -- -- 12/28/20 1500 107/60 -- -- 88 29 93 % -- --   12/28/20 1400 (!) 114/58 98.6 °F (37 °C) Axillary 97 (!) 31 91 % -- --   12/28/20 1300 119/66 -- -- 107 18 90 % -- --   12/28/20 1200 118/60 98.4 °F (36.9 °C) Axillary 78 13 92 % -- --   12/28/20 1100 106/69 -- -- 81 22 97 % -- --   12/28/20 1000 (!) 91/52 98.5 °F (36.9 °C) -- 79 22 96 % -- --   12/28/20 0930 -- -- -- -- 26 94 % -- --   12/28/20 0900 105/61 -- -- 83 20 91 % -- --   12/28/20 0801 -- -- -- -- 13 (!) 89 % -- --   12/28/20 0800 (!) 110/56 -- -- 77 12 (!) 89 % -- --   12/28/20 0700 101/66 -- -- 76 16 92 % -- --   12/28/20 0600 117/70 98.6 °F (37 °C) Axillary 76 25 93 % -- --   12/28/20 0500 100/70 -- -- 82 16 93 % -- --   12/28/20 0400 110/73 98.4 °F (36.9 °C) Axillary 82 16 92 % -- --   12/28/20 0300 116/65 -- -- 80 14 91 % -- --   12/28/20 0200 118/78 98.5 °F (36.9 °C) Axillary 78 12 92 % 5' 7\" (1.702 m) 204 lb 9.4 oz (92.8 kg)   12/28/20 0108 -- 98.6 °F (37 °C) Axillary 124 30 91 % -- --   12/27/20 2200 (!) 140/70 -- -- 82 18 95 % -- --       Oxygen Saturation Interpretation: Normal    ------------------------------------------ PROGRESS NOTES ------------------------------------------  Re-evaluation(s):    Patients symptoms are improving  Repeat physical examination is improved    Counseling:  I have spoken with the patient and discussed todays results, in addition to providing specific details for the plan of care and counseling regarding the diagnosis and prognosis. Their questions are answered at this time and they are agreeable with the plan of admission.    --------------------------------- ADDITIONAL PROVIDER NOTES ---------------------------------  Consultations:  . Spoke with SR for Trauma   They will admit the patient.   This patient's ED course included: a personal history and physicial examination, re-evaluation prior to disposition, IV medications, cardiac monitoring, continuous pulse oximetry and complex medical decision making and initial encounter was also pertinent to this visit.   Current Discharge Medication List        DO Pa Ayala DO  12/28/20 6126

## 2020-12-28 NOTE — PLAN OF CARE
Problem: Pain:  Goal: Control of acute pain  Description: Control of acute pain  Outcome: Met This Shift     Problem: Skin Integrity:  Goal: Will show no infection signs and symptoms  Description: Will show no infection signs and symptoms  Outcome: Met This Shift     Problem: Falls - Risk of:  Goal: Will remain free from falls  Description: Will remain free from falls  Outcome: Met This Shift  Goal: Absence of physical injury  Description: Absence of physical injury  Outcome: Met This Shift

## 2020-12-28 NOTE — PROGRESS NOTES
[facility]  SURGICAL INTENSIVE CARE UNIT (SICU)  ATTENDING PHYSICIAN CRITICAL CARE PROGRESS NOTE     I have examined the patient, reviewed the record, and discussed the case with the APN/ resident. Please refer to the APN/ resident's note. I agree with the assessment and plan. I have reviewed all relevant labs and imaging data. The following summarizes my clinical findings and independent assessment. CC:  Critical care management after fall    Hospital Course/Overnight Events:  12/27--presented after fall; found to have mult right sided rib fx  12/28--urinary retention overnight--harris placed    Patient complains of ongoing right-sided chest wall pain. States it is slightly improved compared to when she came in yesterday.     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    ADHD (attention deficit hyperactivity disorder)     Anxiety     GERD (gastroesophageal reflux disease)     Asthma     Carpal tunnel syndrome     Arthritis        Status post fall  Multiple right-sided rib fractures--pain control (scheduled Tylenol/Robaxin/gabapentin; PRN oxycodone; Dilaudid PCA)--will ask anesthesia to consider epidural catheter placement  Urinary retention--maintain Harris catheter for now  History of fibromyalgia/chronic back pain--continue pain meds  Start diet after epidural in place  PT/OT evals  DVT risk--PCDs    Patient is at risk for respiratory deterioration requires ongoing ICU care    Dori Squires MD, FACS  12/28/2020  7:41 AM      Critical care time exclusive of teaching and procedures = 37 minutes     NOTE: This report was transcribed using voice recognition software. Every effort was made to ensure accuracy; however, inadvertent computerized transcription errors may be present.

## 2020-12-28 NOTE — PROGRESS NOTES
TRAUMA TERTIARY    Admit Date: 12/27/2020    [facility]    CC:    Chief Complaint   Patient presents with   [facility] Consultation     transfer from [facility], trauma consult. Fall on ice, right rib frx 3-8. Alcohol pre-screening:  How many times in the past year have you had 4-5 or more drinks in a day?  none    Subjective:       Pain only moderately controlled despite multimodal pain control & PCA  Some urinary retention overnight, bladder scan and harris placed  Pain localized to Right chest wall, no other complaints      Objective:     Patient Vitals for the past 8 hrs:   BP Temp Temp src Pulse Resp SpO2 Height Weight   12/28/20 0700 101/66 -- -- 76 16 92 % -- --   12/28/20 0600 117/70 98.6 °F (37 °C) Axillary 76 25 93 % -- --   12/28/20 0500 100/70 -- -- 82 16 93 % -- --   12/28/20 0400 110/73 98.4 °F (36.9 °C) Axillary 82 16 92 % -- --   12/28/20 0300 116/65 -- -- 80 14 91 % -- --   12/28/20 0200 118/78 98.5 °F (36.9 °C) Axillary 78 12 92 % 5' 7\" (1.702 m) 204 lb 9.4 oz (92.8 kg)   12/28/20 0108 -- 98.6 °F (37 °C) Axillary 124 30 91 % -- --       I/O last 3 completed shifts: In: 124 [I.V.:454]  Out: 600 [Urine:600]  No intake/output data recorded. Radiology:  CT HEAD WO CONTRAST   Final Result   No acute intracranial abnormality. CT CERVICAL SPINE WO CONTRAST   Final Result   No acute cervical spine fracture or malalignment. Degenerative changes of the cervical spine. CT CHEST W CONTRAST   Final Result   Multiple right rib fractures from 4 through 9th posteriorly and laterally   without pneumothorax. Atelectasis/ground-glass opacities in the lung bases which may be due to   contusions and or pneumonia. There is no solid organ injury or acute traumatic injury in the abdomen and   pelvis. CT ABDOMEN PELVIS W IV CONTRAST Additional Contrast? None   Final Result   Multiple right rib fractures from 4 through 9th posteriorly and laterally   without pneumothorax.    Atelectasis/ground-glass opacities in the lung bases which may be due to   contusions and or pneumonia. There is no solid organ injury or acute traumatic injury in the abdomen and   pelvis. XR PELVIS (1-2 VIEWS)   Final Result   Degenerative changes of bilateral hips right greater than left. No evidence of acute pelvic or hip fracture. XR CHEST PORTABLE   Final Result   Atelectasis in lung bases. There is no focal consolidation or pneumothorax. PHYSICAL EXAM:   GCS:  4 - Opens eyes on own   6 - Follows simple motor commands  5 - Alert and oriented    Pupil size: Left 4 mm     Right 4 mm  Pupil reaction: Yes  Wiggles fingers: Left Yes     Right Yes  Wiggles toes: Left Yes     Right Yes  Plantar flexion: Left normal     Right normal    GENERAL:  NAD. A&Ox3. HEAD:  Normocephalic, atraumatic. LUNGS:  No increased work of breathing. 4L NC. CTAB. -400. CARDIOVASCULAR: RR  ABDOMEN:  Soft, non-distended, non-tender. No guarding, rigidity, rebound. EXTREMITIES:  MAEx4. No LE edema. Atraumatic. SKIN:  Warm and dry      Spine:       Spine Tenderness ROM   Cervical 0 /10 Normal   Thoracic 0 /10 Normal   Lumbar 0 /10 Normal     Musculoskeletal:    Joint Tenderness Swelling/Deformity ROM   Right shoulder absent absent normal   Left shoulder absent absent normal   Right elbow absent absent normal   Left elbow absent absent normal   Right wrist absent absent normal   Left wrist absent absent normal   Right hand grasp absent absent normal   Left hand grasp absent absent normal   Right hip absent absent normal   Left hip absent absent normal   Right knee absent absent normal   Left knee absent absent normal   Right ankle absent absent normal   Left ankle absent absent normal   Right foot absent absent normal   Left foot absent absent normal         CONSULTS: None      Active Problems:    Traumatic closed displaced fracture of multiple ribs  Resolved Problems:    * No resolved hospital problems.  *        Assessment/Plan:     Neuro:

## 2020-12-28 NOTE — PROGRESS NOTES
Physical Therapy  Physical Therapy Initial Assessment     Name: Amber Cobb  : 1970  MRN: [id number]    Referring Provider:  Jessica Angeles MD    Date of Service: 2020    Evaluating PT:  Leisa Carrington, PT, DPT [id number]    Room #:  2135/3954-Z  Diagnosis:  Traumatic closed displaced fractures of ribs  Precautions: Falls, R rib 3-8 fxs, Epidural  Procedure/Surgery:  None  PMHx/PSHx:  Asthma, ADHD, fibromyalgia   Equipment Needs:  TBD    SUBJECTIVE:    Pt lives with boyfriend and 2 children in a 2 story home, 1st floor setup with 3 stairs to enter and no rail. Pt ambulated with no device and was independent PTA. OBJECTIVE:   Initial Evaluation  Date: 20 Treatment Short Term/ Long Term   Goals   AM-PAC 6 Clicks 74/33     Was pt agreeable to Eval/treatment? Yes     Does pt have pain?  6-/10 R rib pain     Bed Mobility  Rolling: NT  Supine to sit: ModA x 2 with HOB elevated  Sit to supine: NT  Scooting: ModA  Mod Independent   Transfers Sit to stand: Juan Carlos  Stand to sit: Juan Carlos  Stand pivot: Juan Carlos no device  Independent   Ambulation   75 feet with Juan Carlos no device  >400 feet Independently   Stair negotiation: ascended and descended NT  >10 steps with 1 rail Mod Independent   ROM BUE:  Defer to OT note  BLE:  WNL     Strength BUE:  Defer to OT note  BLE:  4/5  Increase by 1/3 MMT grade   Balance Sitting EOB:  Juan Carlos dynamic  Dynamic Standing:  Juan Carlos no device  Sitting EOB:  Independent  Dynamic Standing:  Independent     Pt is A & O x 4  CAM-ICU: NT  RASS: 0  Sensation:  WNL  Edema:  None    Vitals:  Heart Rate at rest 105 bpm Heart Rate post session 97 bpm   SpO2 at rest 90% SpO2 post session 90%   Blood Pressure at rest 119/66 mmHg Blood Pressure post session 117/64 mmHg     Functional Status Score-Intensive Care Unit (FSS-ICU)   Rolling -/7   Supine to sit transfer 2/7   Unsupported sitting  4/   Sit to stand transfers 4/   Ambulation 2/   Total         Therapeutic Exercises: current medical information, gathering information on past medical history/social history and prior level of function, completion of standardized testing/informal observation of tasks, assessment of data and education on plan of care and goals.     CPT codes:  [] Low Complexity PT evaluation 18781  [x] Moderate Complexity PT evaluation 45365  [] High Complexity PT evaluation 03912  [] PT Re-evaluation 77538  [] Gait training 26985 - minutes  [] Manual therapy 57573 - minutes  [x] Therapeutic activities 95042 25 minutes  [] Therapeutic exercises 92415 - minutes  [] Neuromuscular reeducation 94874 - minutes     Chelsea Millan, PT, DPT  [id number]

## 2020-12-28 NOTE — PROGRESS NOTES
OCCUPATIONAL THERAPY INITIAL EVALUATION      Date:2020  Patient Name: Tod Bermudez  MRN: [id number]  : 1970  Room: [facility]    Referring Provider: Kody Millan MD    Evaluating OT: Yomaira Bender, OTR/L [id number]      AM-PAC Daily Activity Raw Score: 14    Recommended Adaptive Equipment: TBA: reacher, long handled sponge, raised commode seat, shower seat for energy conservation. Diagnosis: Trauma: Fall on ice    Pertinent Medical History: ADHD, Anemia, Anxiety, Arthritis, Asthma, CTS, Fibromyalgia    Precautions:  Falls, R rib fx 3-8, epidural      Home Living: Pt lives with her significant other and her 2 children in a 2 story home with 3 step(s) to enter and no rail(s); bed/bath on first floor. Pt reports she can stay on first floor. Bathroom setup: tub/shower. Lower commode seat. Equipment owned: no DME    Prior Level of Function: IND with ADLs;  IND with IADLs. No device for ambulation. Driving: yes  Occupation: works as a massage therapist for [employer]    Pain Level: pt c/o 7/10 rib cage pain  this session; 4/10 headache      Cognition: A&O: 4/4    Follows 1-2 step commands appropriately.     Memory: good   Comprehension good   Problem solving: good   Judgement/safety: good               Communication skills: WFL           Vision: WFL               Glasses:reading                                                    Hearing: WFL     RASS: 0  CAM-ICU: (NT) Delirium    UE Assessment:   Hand Dominance: Right [x]  Left []     ROM Strength   RUE  WFL 4/5   LUE WFL 4/5     Sensation: No c/o numbness or tingling in extremities   Tone: WNL   Edema: [facility]     Functional Assessment   Initial Eval Status  Date:  Treatment Status  Date: STG=LTG  3-5 days   Feeding S; set up                        IND  while seated up in chair to increase activity tolerance        Grooming Min A                        Rene   while stading sink level requiring no device for balance and demonstrating G tolerance      UB dressing/bathing Mod A                        Rene       LB dressing/bathing Mod A  seated                        Rene   using AE as needed for safe reach/ energy conservation       Toileting NT                        Rene     Bed Mobility  Supine to sit: Mod A+2 with HOB elevated    Sit to supine: NT                        Rene  in prep of ADL tasks & transfers   Functional Transfers Sit to stand: Min A from bed/lower chair    Stand to sit: Min A                        Rene  sit<>stand/functional bathroom transfers using AD/DME as needed for balance and safety   Functional Mobility Min A                       Rene   functional/bathroom mobility using AD as needed & demonstrating G safety     Balance Sitting:     Static:  S    Dynamic:S  Standing: Min A  Rene dynamic sitting balance; Rene dynamic standing balance  during ADL tasks & transfers   Endurance/Activity Tolerance   F tolerance with moderate activity.    G   tolerance with moderate activity/self care routine   Visual/  Perceptual   WFL                       Vitals:   Heart Rate at rest 105 bpm Heart Rate post session 97 bpm   SpO2 at rest 90% SpO2 post session 90%   Blood Pressure at rest 119/66 mmHg Blood Pressure post session 117/64 mmHg      Assessment of current deficits   [x]Functional mobility   [x]ADLs [x]Strength  []Cognition  [x]Functional transfers  [x]IADLs [x]Safety Awareness  [x]Endurance  []Fine Motor Coordination  [x]Balance      []Vision/perception  []Sensation    []Gross Motor Coordination  [x]ROM  []Delirium                  []Communication     Plan of Care: 1-3 days/week for 1 week PRN  ADL retraining/adapted techniques and AE recommendations to increase functional independence within precautions                    Energy conservation techniques to improve tolerance for selfcare routine   Functional transfer/mobility training/DME recommendations for increased independence, safety and fall prevention         Patient/family education to increase safety and functional independence             Environmental modifications for safe mobility and completion of ADLs                           Therapeutic activity to improve functional performance during ADLs. Therapeutic exercise to improve tolerance and functional strength for ADLs   Balance retraining/tolerance tasks for facilitation of postural control with dynamic challenges during ADLs . Treatment: OT intervention provided to achieve goals:   Bed mobility: Instruction on precautions prior to bed mobility to facilitate safe transfers and ADLS. Pt required 2 person assist for safe mobility due to complexity of medical condition, medical lines and deconditioning. HOB elevated to assist with pain management. Balance retraining: Performed sitting balance ex's to improve righting reactions with postural changes during ADLS. Pt able to cross LE's for safe reach during LB ADLs. ADL retraining: Instruction on adapted techniques (precautions) to increase independence and safe reach during dressing/bathing activities. Pt demonstrated good follow through. Pt may benefit from use of reacher, LH sponge for energy conservation. Energy conservation: Education on breathing techniques, pacing, work simplification strategies & recommended bathroom DME for safety and energy conservation during self care tasks and activities of daily living. Line management and environmental modifications made prior to and end of session to ensure patient safety and to increase efficiency of session. Skilled monitoring of HR, O2 saturation, blood pressure and patient's response to activity performed throughout session. Comments: OK from RN to see patient. Upon arrival, patient supine in bed; agreeable to session. Pt demo fair tolerance with good understanding of education/techniques. At end of session, patient assisted to bedside chair. Call light within reach, all lines and tubes intact.  Pt instructed on use of call light for assistance and fall prevention. .    Patient presents with decreased ROM/strength,activity tolerance, dynamic balance, functional mobility limiting completion of ADLs and safety. Pt can benefit from continued skilled OT to increase safety and functional independence. Evaluation Complexity: Moderate    · History: Expanded chart review of consults, imaging, and psychosocial history related to current functional performance. · Exam: 5+ performance deficits identified limiting functional independence and safe return home   · Assistance/Modification: Min/mod assistance or modifications required to perform tasks. May have comorbidities that affect occupational performance. Rehab Potential: Good for established goals    Patient / Family Goal: to return to OF    Patient and/or family were instructed/educated on diagnosis, prognosis/goals and plan of care. Pt demonstrated G understanding. [] Malnutrition indicators have been identified and nursing has been notified to ensure a dietitian consult is ordered. Time In:1315             Time Out: 1340         Total Treatment time: 15   Min Units   OT Eval Low 09425     OT Eval Medium 32574 X    OT Eval High 42260     OT Re-Eval [id number]     Therapeutic Ex [id number]     Therapeutic Activities 47424     ADL/Self Care 43115 15 1   Orthotic Management 79901     Neuro Re-Ed 81819     Non-Billable Time        Evaluation time includes thorough review of current medical information, gathering information on past medical history/social history and prior level of function, completion of standardized testing/informal observation of tasks, assessment of data and development of POC/Goals.      Ashanti Valenzuela, OTR/L 9801

## 2020-12-28 NOTE — H&P
TRAUMA SURGERY HISTORY & PHYSICAL  TRAUMA ATTENDING      Attending Physician Statement:  I have examined the patient in ED, reviewed the record, and discussed the case with the resident/APN. I agree with the  assessment and plan with the following corrections/additions. CC: s/p GLF, slipped on ice, right chest wall pain    HISTORY   The patient is a 47 y/o female who sustained a GLF at approximately 1400. The patient reported  acute, constant  sharp pain localized to the right chest wall that started immediately. The intensity of the pain is 10/10. Pain does not radiate. There are no alleviating or worsening factors regarding the pain. The patient was transported by EMS to the [place] [address] [address] [facility] from [facility].   Evaluation prior to arrival included: CXR. Treatment prior to arrival included: analgesia. A trauma consult was requested to assist, guide,  and expedite further evaluation and treatment for the patient. Past medical/surgical/family/social history:  as noted in resident/APN note  The family history is noncontributory.     Review of Systems:  General ROS: negative  Psychological ROS: negative  ENT ROS: negative  Hematological and Lymphatic ROS: negative  Respiratory ROS: negative  Cardiovascular ROS: negative  Gastrointestinal ROS: negative  Genito-Urinary ROS: negative  Musculoskeletal ROS: right chest wall pain  Neurological ROS: negative     PHYSICAL EXAM:  Vitals:    12/27/20 2200   BP: (!) 140/70   Pulse: 82   Resp: 18   Temp:    SpO2: 95%       Neuro:   GCS 15    Moving all extremities   Reactive, equal pupils  Psychiatric:  Affect normal, Judgement normal   Alert and oriented to self, place, time  Head/Face:   no soft tissue injury  no bony deformities  Eyes:    Vision/EOM grossly intact  Ears:    no otorrhagia  Nose:     no epistaxis  Mouth:    no intraoral lacerations/ecchymoses  Neck:   no tracheal deviation   no soft tissue injury  Chest:    no deformities   tender--right chest wall  Lungs:     equal and clear bilateral breath sounds  no use of accessory muscles  Heart:    normal sinus rhythm  warm, well perfused  strong femoral pulses bilaterally  Abdomen:    non distended  non tender  no soft tissue injury  Back:    no soft tissue injury  no deformities  nontender   Musculoskeletal:    Gait not inspected due to patient on trauma bed  RUE:  no soft tissue injury, swelling, or deformity  RLE:  no soft tissue injury, swelling, or deformity  LUE:  no soft tissue injury, swelling, or deformity  LLE:  no soft tissue injury, swelling, or deformity    DIAGNOSTIC/ LAB FINDINGS  I  Independently reviewed the available laboratory studies and diagnostic imaging. ASSESSMENT:  1. Right rib fractures  2. Fibromyalgia  3. Chronic pain syndrome  4.   Tobacco abuse 1/2 ppd    PLAN:  Admit to SICU  Anesthesia for epidural, if unable to provide, start PCA  Robaxin  SMI, PEP flutter  PT/OT  Nicotine patch and smoking cessation education      MD ELOISE [facility]  12/27/2020  10:40 PM

## 2020-12-29 LAB
ALBUMIN SERPL-MCNC: 3.4 G/DL (ref 3.5–5.2)
ALP BLD-CCNC: 70 U/L (ref 35–104)
ALT SERPL-CCNC: 16 U/L (ref 0–32)
ANION GAP SERPL CALCULATED.3IONS-SCNC: 8 MMOL/L (ref 7–16)
AST SERPL-CCNC: 18 U/L (ref 0–31)
BILIRUB SERPL-MCNC: 0.2 MG/DL (ref 0–1.2)
BUN BLDV-MCNC: 12 MG/DL (ref 6–20)
CALCIUM IONIZED: 1.25 MMOL/L (ref 1.15–1.33)
CALCIUM SERPL-MCNC: 8.6 MG/DL (ref 8.6–10.2)
CHLORIDE BLD-SCNC: 106 MMOL/L (ref 98–107)
CO2: 26 MMOL/L (ref 22–29)
CREAT SERPL-MCNC: 0.8 MG/DL (ref 0.5–1)
GFR AFRICAN AMERICAN: >60
GFR NON-AFRICAN AMERICAN: >60 ML/MIN/1.73
GLUCOSE BLD-MCNC: 71 MG/DL (ref 74–99)
HCT VFR BLD CALC: 33.4 % (ref 34–48)
HEMOGLOBIN: 10.3 G/DL (ref 11.5–15.5)
MAGNESIUM: 2 MG/DL (ref 1.6–2.6)
MCH RBC QN AUTO: 28.4 PG (ref 26–35)
MCHC RBC AUTO-ENTMCNC: 30.8 % (ref 32–34.5)
MCV RBC AUTO: 92 FL (ref 80–99.9)
PDW BLD-RTO: 14.7 FL (ref 11.5–15)
PHOSPHORUS: 3.7 MG/DL (ref 2.5–4.5)
PLATELET # BLD: 263 E9/L (ref 130–450)
PMV BLD AUTO: 10.5 FL (ref 7–12)
POTASSIUM REFLEX MAGNESIUM: 3.8 MMOL/L (ref 3.5–5)
RBC # BLD: 3.63 E12/L (ref 3.5–5.5)
SODIUM BLD-SCNC: 140 MMOL/L (ref 132–146)
TOTAL PROTEIN: 5.9 G/DL (ref 6.4–8.3)
WBC # BLD: 8 E9/L (ref 4.5–11.5)

## 2020-12-29 PROCEDURE — 84100 ASSAY OF PHOSPHORUS: CPT

## 2020-12-29 PROCEDURE — 82330 ASSAY OF CALCIUM: CPT

## 2020-12-29 PROCEDURE — 80053 COMPREHEN METABOLIC PANEL: CPT

## 2020-12-29 PROCEDURE — 2580000003 HC RX 258: Performed by: STUDENT IN AN ORGANIZED HEALTH CARE EDUCATION/TRAINING PROGRAM

## 2020-12-29 PROCEDURE — 94640 AIRWAY INHALATION TREATMENT: CPT

## 2020-12-29 PROCEDURE — 36415 COLL VENOUS BLD VENIPUNCTURE: CPT

## 2020-12-29 PROCEDURE — 6370000000 HC RX 637 (ALT 250 FOR IP): Performed by: STUDENT IN AN ORGANIZED HEALTH CARE EDUCATION/TRAINING PROGRAM

## 2020-12-29 PROCEDURE — 6360000002 HC RX W HCPCS: Performed by: STUDENT IN AN ORGANIZED HEALTH CARE EDUCATION/TRAINING PROGRAM

## 2020-12-29 PROCEDURE — 2000000000 HC ICU R&B

## 2020-12-29 PROCEDURE — 97530 THERAPEUTIC ACTIVITIES: CPT

## 2020-12-29 PROCEDURE — 2700000000 HC OXYGEN THERAPY PER DAY

## 2020-12-29 PROCEDURE — 83735 ASSAY OF MAGNESIUM: CPT

## 2020-12-29 PROCEDURE — 99233 SBSQ HOSP IP/OBS HIGH 50: CPT | Performed by: SURGERY

## 2020-12-29 PROCEDURE — 85027 COMPLETE CBC AUTOMATED: CPT

## 2020-12-29 RX ADMIN — CALCIUM CARBONATE 500 MG: 500 TABLET, CHEWABLE ORAL at 13:32

## 2020-12-29 RX ADMIN — PANTOPRAZOLE SODIUM 40 MG: 40 TABLET, DELAYED RELEASE ORAL at 07:20

## 2020-12-29 RX ADMIN — METHOCARBAMOL TABLETS 1500 MG: 750 TABLET, COATED ORAL at 17:12

## 2020-12-29 RX ADMIN — GABAPENTIN 100 MG: 100 CAPSULE ORAL at 13:32

## 2020-12-29 RX ADMIN — ALBUTEROL SULFATE 2.5 MG: 2.5 SOLUTION RESPIRATORY (INHALATION) at 16:15

## 2020-12-29 RX ADMIN — POTASSIUM BICARBONATE 20 MEQ: 782 TABLET, EFFERVESCENT ORAL at 08:27

## 2020-12-29 RX ADMIN — METHOCARBAMOL TABLETS 1500 MG: 750 TABLET, COATED ORAL at 20:04

## 2020-12-29 RX ADMIN — CALCIUM CARBONATE 500 MG: 500 TABLET, CHEWABLE ORAL at 20:04

## 2020-12-29 RX ADMIN — SENNOSIDES AND DOCUSATE SODIUM 1 TABLET: 8.6; 5 TABLET ORAL at 08:28

## 2020-12-29 RX ADMIN — ACETAMINOPHEN 1000 MG: 500 TABLET ORAL at 08:27

## 2020-12-29 RX ADMIN — ACETAMINOPHEN 1000 MG: 500 TABLET ORAL at 19:52

## 2020-12-29 RX ADMIN — ALBUTEROL SULFATE 2.5 MG: 2.5 SOLUTION RESPIRATORY (INHALATION) at 08:49

## 2020-12-29 RX ADMIN — METHOCARBAMOL TABLETS 1500 MG: 750 TABLET, COATED ORAL at 08:27

## 2020-12-29 RX ADMIN — SENNOSIDES AND DOCUSATE SODIUM 1 TABLET: 8.6; 5 TABLET ORAL at 20:04

## 2020-12-29 RX ADMIN — CALCIUM CARBONATE 500 MG: 500 TABLET, CHEWABLE ORAL at 08:27

## 2020-12-29 RX ADMIN — Medication 10 ML: at 20:04

## 2020-12-29 RX ADMIN — GABAPENTIN 100 MG: 100 CAPSULE ORAL at 08:27

## 2020-12-29 RX ADMIN — ENOXAPARIN SODIUM 30 MG: 30 INJECTION SUBCUTANEOUS at 12:01

## 2020-12-29 RX ADMIN — GABAPENTIN 100 MG: 100 CAPSULE ORAL at 20:30

## 2020-12-29 RX ADMIN — ENOXAPARIN SODIUM 30 MG: 30 INJECTION SUBCUTANEOUS at 20:30

## 2020-12-29 RX ADMIN — METHOCARBAMOL TABLETS 1500 MG: 750 TABLET, COATED ORAL at 13:32

## 2020-12-29 RX ADMIN — ALBUTEROL SULFATE 2.5 MG: 2.5 SOLUTION RESPIRATORY (INHALATION) at 20:16

## 2020-12-29 RX ADMIN — ACETAMINOPHEN 1000 MG: 500 TABLET ORAL at 02:45

## 2020-12-29 RX ADMIN — POLYETHYLENE GLYCOL 3350 17 G: 17 POWDER, FOR SOLUTION ORAL at 08:28

## 2020-12-29 RX ADMIN — ALBUTEROL SULFATE 2.5 MG: 2.5 SOLUTION RESPIRATORY (INHALATION) at 12:47

## 2020-12-29 RX ADMIN — Medication 10 ML: at 08:28

## 2020-12-29 ASSESSMENT — PAIN SCALES - GENERAL
PAINLEVEL_OUTOF10: 0
PAINLEVEL_OUTOF10: 4
PAINLEVEL_OUTOF10: 6
PAINLEVEL_OUTOF10: 3

## 2020-12-29 NOTE — PROGRESS NOTES
Physical Therapy  Physical Therapy Treatment Note     Name: Lang Gama  : 1970  MRN: [id number]    Referring Provider:  Fabby Husain MD    Date of Service: 2020    Evaluating PT:  oHward Perez, PT, DPT [id number]    Room #:  5817/3228-W  Diagnosis:  Traumatic closed displaced fractures of ribs  Precautions: Falls, R rib 3-8 fxs, Epidural  Procedure/Surgery:  None  PMHx/PSHx:  Asthma, ADHD, fibromyalgia   Equipment Needs:  TBD    SUBJECTIVE:    Pt lives with boyfriend and 2 children in a 2 story home, 1st floor setup with 3 stairs to enter and no rail. Pt ambulated with no device and was independent PTA. OBJECTIVE:   Initial Evaluation  Date: 20 Treatment  20 Short Term/ Long Term   Goals   AM-PAC 6 Clicks 95/56 47/24    Was pt agreeable to Eval/treatment? Yes Yes    Does pt have pain?  6-7/10 R rib pain No c/o pain at rest    Bed Mobility  Rolling: NT  Supine to sit: ModA x 2 with HOB elevated  Sit to supine: NT  Scooting: ModA NT Mod Independent   Transfers Sit to stand: Juan Carlos  Stand to sit: Juan Carlos  Stand pivot: Juan Carlos no device Sit to stand: SBA  Stand to sit: SBA  Stand pivot: CGA no device Independent   Ambulation   75 feet with Juan Carlos no device 150 feet with CGA no device >400 feet Independently   Stair negotiation: ascended and descended NT NT >10 steps with 1 rail Mod Independent   ROM BUE:  Defer to OT note  BLE:  WNL     Strength BUE:  Defer to OT note  BLE:  4/5  Increase by 1/3 MMT grade   Balance Sitting EOB:  Juan Carlos dynamic  Dynamic Standing:  Juan Carlos no device Sitting EOB:  NT  Dynamic Standing:  CGA no device Sitting EOB:  Independent  Dynamic Standing:  Independent     Pt is A & O x 4  CAM-ICU: NT  RASS: 0  Sensation:  WNL  Edema:  None    Vitals:  Heart Rate at rest 94 bpm Heart Rate post session 92 bpm   SpO2 at rest 93% SpO2 post session 95%   Blood Pressure at rest 120/71 mmHg Blood Pressure post session 127/67 mmHg     Functional Status Score-Intensive Care Unit

## 2020-12-29 NOTE — PLAN OF CARE
Problem: Pain:  Goal: Pain level will decrease  Description: Pain level will decrease  12/29/2020 1224 by Linda Gracia RN  Outcome: Met This Shift  12/29/2020 0231 by Vernon Whaley  Outcome: Met This Shift  Goal: Control of acute pain  Description: Control of acute pain  12/29/2020 1224 by Linda Gracia RN  Outcome: Met This Shift  12/29/2020 0231 by Vernon Whaley  Outcome: Met This Shift  Goal: Control of chronic pain  Description: Control of chronic pain  Outcome: Met This Shift     Problem: Skin Integrity:  Goal: Will show no infection signs and symptoms  Description: Will show no infection signs and symptoms  12/29/2020 1224 by Linda Gracia RN  Outcome: Met This Shift  12/29/2020 0231 by Vernon Whaley  Outcome: Met This Shift  Goal: Absence of new skin breakdown  Description: Absence of new skin breakdown  Outcome: Met This Shift     Problem: Falls - Risk of:  Goal: Will remain free from falls  Description: Will remain free from falls  12/29/2020 1224 by Linda Gracia RN  Outcome: Met This Shift  12/29/2020 0231 by Vernon Whlaey  Outcome: Met This Shift  Goal: Absence of physical injury  Description: Absence of physical injury  Outcome: Met This Shift     Problem: Musculor/Skeletal Functional Status  Goal: Highest potential functional level  12/29/2020 1224 by Linda Gracia RN  Outcome: Met This Shift  12/29/2020 0231 by Vernon Whaley  Outcome: Met This Shift  Goal: Absence of falls  Outcome: Met This Shift

## 2020-12-29 NOTE — PROGRESS NOTES
Physician Progress Note      PATIENT:               Miko Marshall  CSN #:                  [id number]  :                       1970  ADMIT DATE:       2020 4:46 PM  [address] [place] DATE:  RESPONDING  PROVIDER #:        Kwesi Ny MD          QUERY TEXT:    Pt admitted with multiple rib fractures and has noted history of fibromyalgia   with chronic pain medication usage. Please document any correlating medical   diagnosis in the medical record: The medical record reflects the following:  Risk Factors: Chronic pain  Clinical Indicators: Per progress note  -- hx Fibromyalgia and chronic   pain medication usage will likely limit ability to achieve adequate pain   control despite multimodal therapy and PCA; per progress note Multiple   right-sided rib fractures--pain control (scheduled Tylenol/Robaxin/gabapentin;   PRN oxycodone; Dilaudid PCA)--will ask anesthesia to consider epidural   catheter placement  Treatment: Dilaudid PCA, possible epidural cath, and close monitoring in the   surgical intensive care unit    Thank you  Cristian CLEMENTSN, RN, CCDS  Clinical Documentation Improvement  Options provided:  -- Opioid dependence  -- opioid abuse  -- Opioid use  -- Other - I will add my own diagnosis  -- Disagree - Not applicable / Not valid  -- Disagree - Clinically unable to determine / Unknown  -- Refer to Clinical Documentation Reviewer    PROVIDER RESPONSE TEXT:    This patient is opioid dependent.     Query created by: Tara Dickinson on  8:90 AM      Electronically signed by:  Kwesi Ny MD 2020 12:34 PM

## 2020-12-30 LAB
ALBUMIN SERPL-MCNC: 3.5 G/DL (ref 3.5–5.2)
ALP BLD-CCNC: 71 U/L (ref 35–104)
ALT SERPL-CCNC: 15 U/L (ref 0–32)
ANION GAP SERPL CALCULATED.3IONS-SCNC: 8 MMOL/L (ref 7–16)
AST SERPL-CCNC: 16 U/L (ref 0–31)
BACTERIA: ABNORMAL /HPF
BILIRUB SERPL-MCNC: 0.2 MG/DL (ref 0–1.2)
BILIRUBIN URINE: NEGATIVE
BLOOD, URINE: ABNORMAL
BUN BLDV-MCNC: 11 MG/DL (ref 6–20)
CALCIUM SERPL-MCNC: 8.8 MG/DL (ref 8.6–10.2)
CHLORIDE BLD-SCNC: 104 MMOL/L (ref 98–107)
CLARITY: CLEAR
CO2: 28 MMOL/L (ref 22–29)
COLOR: YELLOW
CREAT SERPL-MCNC: 0.7 MG/DL (ref 0.5–1)
EPITHELIAL CELLS, UA: ABNORMAL /HPF
GFR AFRICAN AMERICAN: >60
GFR NON-AFRICAN AMERICAN: >60 ML/MIN/1.73
GLUCOSE BLD-MCNC: 93 MG/DL (ref 74–99)
GLUCOSE URINE: NEGATIVE MG/DL
HCT VFR BLD CALC: 32.7 % (ref 34–48)
HEMOGLOBIN: 10.1 G/DL (ref 11.5–15.5)
KETONES, URINE: NEGATIVE MG/DL
LEUKOCYTE ESTERASE, URINE: ABNORMAL
MAGNESIUM: 1.8 MG/DL (ref 1.6–2.6)
MCH RBC QN AUTO: 28.4 PG (ref 26–35)
MCHC RBC AUTO-ENTMCNC: 30.9 % (ref 32–34.5)
MCV RBC AUTO: 91.9 FL (ref 80–99.9)
NITRITE, URINE: NEGATIVE
PDW BLD-RTO: 14.7 FL (ref 11.5–15)
PH UA: 6.5 (ref 5–9)
PHOSPHORUS: 3.8 MG/DL (ref 2.5–4.5)
PLATELET # BLD: 269 E9/L (ref 130–450)
PMV BLD AUTO: 10.9 FL (ref 7–12)
POTASSIUM REFLEX MAGNESIUM: 4.5 MMOL/L (ref 3.5–5)
PROTEIN UA: NEGATIVE MG/DL
RBC # BLD: 3.56 E12/L (ref 3.5–5.5)
RBC UA: ABNORMAL /HPF (ref 0–2)
SODIUM BLD-SCNC: 140 MMOL/L (ref 132–146)
SPECIFIC GRAVITY UA: <=1.005 (ref 1–1.03)
TOTAL PROTEIN: 6 G/DL (ref 6.4–8.3)
UROBILINOGEN, URINE: 0.2 E.U./DL
WBC # BLD: 11.9 E9/L (ref 4.5–11.5)
WBC UA: >20 /HPF (ref 0–5)

## 2020-12-30 PROCEDURE — 6360000002 HC RX W HCPCS: Performed by: ANESTHESIOLOGY

## 2020-12-30 PROCEDURE — 2580000003 HC RX 258: Performed by: STUDENT IN AN ORGANIZED HEALTH CARE EDUCATION/TRAINING PROGRAM

## 2020-12-30 PROCEDURE — 94640 AIRWAY INHALATION TREATMENT: CPT

## 2020-12-30 PROCEDURE — 84100 ASSAY OF PHOSPHORUS: CPT

## 2020-12-30 PROCEDURE — 6360000002 HC RX W HCPCS: Performed by: STUDENT IN AN ORGANIZED HEALTH CARE EDUCATION/TRAINING PROGRAM

## 2020-12-30 PROCEDURE — 83735 ASSAY OF MAGNESIUM: CPT

## 2020-12-30 PROCEDURE — 81001 URINALYSIS AUTO W/SCOPE: CPT

## 2020-12-30 PROCEDURE — 2700000000 HC OXYGEN THERAPY PER DAY

## 2020-12-30 PROCEDURE — 85027 COMPLETE CBC AUTOMATED: CPT

## 2020-12-30 PROCEDURE — 36415 COLL VENOUS BLD VENIPUNCTURE: CPT

## 2020-12-30 PROCEDURE — 6370000000 HC RX 637 (ALT 250 FOR IP): Performed by: STUDENT IN AN ORGANIZED HEALTH CARE EDUCATION/TRAINING PROGRAM

## 2020-12-30 PROCEDURE — 2580000003 HC RX 258: Performed by: ANESTHESIOLOGY

## 2020-12-30 PROCEDURE — 2500000003 HC RX 250 WO HCPCS: Performed by: ANESTHESIOLOGY

## 2020-12-30 PROCEDURE — 80053 COMPREHEN METABOLIC PANEL: CPT

## 2020-12-30 PROCEDURE — 2000000000 HC ICU R&B

## 2020-12-30 PROCEDURE — 99233 SBSQ HOSP IP/OBS HIGH 50: CPT | Performed by: SURGERY

## 2020-12-30 RX ORDER — MAGNESIUM SULFATE IN WATER 40 MG/ML
2 INJECTION, SOLUTION INTRAVENOUS ONCE
Status: COMPLETED | OUTPATIENT
Start: 2020-12-30 | End: 2020-12-30

## 2020-12-30 RX ADMIN — GABAPENTIN 100 MG: 100 CAPSULE ORAL at 20:07

## 2020-12-30 RX ADMIN — METHOCARBAMOL TABLETS 1500 MG: 750 TABLET, COATED ORAL at 12:26

## 2020-12-30 RX ADMIN — ACETAMINOPHEN 1000 MG: 500 TABLET ORAL at 01:23

## 2020-12-30 RX ADMIN — FENTANYL CITRATE: 0.05 INJECTION, SOLUTION INTRAMUSCULAR; INTRAVENOUS at 14:30

## 2020-12-30 RX ADMIN — MAGNESIUM SULFATE HEPTAHYDRATE 2 G: 40 INJECTION, SOLUTION INTRAVENOUS at 06:39

## 2020-12-30 RX ADMIN — ENOXAPARIN SODIUM 30 MG: 30 INJECTION SUBCUTANEOUS at 08:19

## 2020-12-30 RX ADMIN — PANTOPRAZOLE SODIUM 40 MG: 40 TABLET, DELAYED RELEASE ORAL at 05:02

## 2020-12-30 RX ADMIN — ALBUTEROL SULFATE 2.5 MG: 2.5 SOLUTION RESPIRATORY (INHALATION) at 08:48

## 2020-12-30 RX ADMIN — ACETAMINOPHEN 1000 MG: 500 TABLET ORAL at 14:33

## 2020-12-30 RX ADMIN — ALBUTEROL SULFATE 2.5 MG: 2.5 SOLUTION RESPIRATORY (INHALATION) at 20:35

## 2020-12-30 RX ADMIN — Medication 10 ML: at 20:09

## 2020-12-30 RX ADMIN — ENOXAPARIN SODIUM 30 MG: 30 INJECTION SUBCUTANEOUS at 20:08

## 2020-12-30 RX ADMIN — GABAPENTIN 100 MG: 100 CAPSULE ORAL at 08:18

## 2020-12-30 RX ADMIN — CALCIUM CARBONATE 500 MG: 500 TABLET, CHEWABLE ORAL at 08:19

## 2020-12-30 RX ADMIN — Medication 10 ML: at 08:19

## 2020-12-30 RX ADMIN — METHOCARBAMOL TABLETS 1500 MG: 750 TABLET, COATED ORAL at 16:07

## 2020-12-30 RX ADMIN — ALBUTEROL SULFATE 2.5 MG: 2.5 SOLUTION RESPIRATORY (INHALATION) at 11:42

## 2020-12-30 RX ADMIN — GABAPENTIN 100 MG: 100 CAPSULE ORAL at 14:30

## 2020-12-30 RX ADMIN — METHOCARBAMOL TABLETS 1500 MG: 750 TABLET, COATED ORAL at 20:07

## 2020-12-30 RX ADMIN — METHOCARBAMOL TABLETS 1500 MG: 750 TABLET, COATED ORAL at 08:15

## 2020-12-30 RX ADMIN — ACETAMINOPHEN 1000 MG: 500 TABLET ORAL at 08:18

## 2020-12-30 RX ADMIN — CALCIUM CARBONATE 500 MG: 500 TABLET, CHEWABLE ORAL at 20:14

## 2020-12-30 RX ADMIN — CALCIUM CARBONATE 500 MG: 500 TABLET, CHEWABLE ORAL at 14:33

## 2020-12-30 RX ADMIN — ACETAMINOPHEN 1000 MG: 500 TABLET ORAL at 20:07

## 2020-12-30 ASSESSMENT — PAIN SCALES - GENERAL
PAINLEVEL_OUTOF10: 6
PAINLEVEL_OUTOF10: 2
PAINLEVEL_OUTOF10: 0
PAINLEVEL_OUTOF10: 6
PAINLEVEL_OUTOF10: 3
PAINLEVEL_OUTOF10: 6
PAINLEVEL_OUTOF10: 0
PAINLEVEL_OUTOF10: 5

## 2020-12-30 NOTE — PROGRESS NOTES
ACUTE PAIN      Thoracic epidural in place    Pain well controlled at rest but states still significant pain with movement    Incentive spirometer now over 1000 so I feel epidural is giving significant analgesia. Recommend to continue at current rate. Will consider increasing tomorrow if needed to promote more activity.     Sonu Doshi DO  December 29, 2020  10:03 PM

## 2020-12-30 NOTE — PROGRESS NOTES
[facility]  SURGICAL INTENSIVE CARE UNIT (SICU)  ATTENDING PHYSICIAN CRITICAL CARE PROGRESS NOTE     I have examined the patient, reviewed the record, and discussed the case with the APN/ resident. Please refer to the APN/ resident's note. I agree with the assessment and plan. I have reviewed all relevant labs and imaging data. The following summarizes my clinical findings and independent assessment. CC:  Critical care management after fall    Hospital Course/Overnight Events:  12/27--presented after fall; found to have mult right sided rib fx  12/28--urinary retention overnight--harris placed  12/29--epidural placed yesterday; no new issues  12/30--no overnight issues    Patient reports feeling much better today. 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  Every effort was made to ensure accuracy; however, inadvertent computerized transcription errors may be present.

## 2020-12-30 NOTE — PROGRESS NOTES
Patient complaining of burning and frequency with urination, which is worsening. Will notify physician.

## 2020-12-30 NOTE — PLAN OF CARE
Problem: Falls - Risk of:  Goal: Will remain free from falls  Description: Will remain free from falls  12/29/2020 2357 by Jesus Johnson RN  Outcome: Met This Shift  12/29/2020 2033 by Jesus Johnson RN  Outcome: Met This Shift  12/29/2020 1224 by Romayne Dickinson, RN  Outcome: Met This Shift     Problem: Falls - Risk of:  Goal: Absence of physical injury  Description: Absence of physical injury  12/29/2020 2357 by Jesus Johnson RN  Outcome: Met This Shift  12/29/2020 2033 by Jesus Johnson RN  Outcome: Met This Shift  12/29/2020 1224 by Romayne Dickinson, RN  Outcome: Met This Shift     Problem: Pain:  Goal: Control of acute pain  Description: Control of acute pain  12/29/2020 2357 by Jesus Johnson RN  Outcome: Not Met This Shift  12/29/2020 2033 by Jesus Johnson RN  Outcome: Not Met This Shift  12/29/2020 1224 by Romayne Dickinson, RN  Outcome: Met This Shift     Problem: Breathing Pattern - Ineffective:  Goal: Ability to achieve and maintain a regular respiratory rate will improve  Description: Ability to achieve and maintain a regular respiratory rate will improve  12/29/2020 2357 by Jesus Johnson RN  Outcome: Not Met This Shift  12/29/2020 2034 by Jesus Johnson RN  Outcome: Not Met This Shift

## 2020-12-30 NOTE — PROGRESS NOTES
Patient complaining of feeling \"jittery\" and stated feeling present for past 2 hours. Patient also stated she has been on Tramadol at home and was taken off completely since admission to this unit. Patient currently has Epidural infusing. Anesthesia in room and stated feeling would not be from Epidural. Patient afebrile. Will notify physician.

## 2020-12-30 NOTE — PLAN OF CARE
Problem: Falls - Risk of:  Goal: Will remain free from falls  Description: Will remain free from falls  12/29/2020 2033 by Marian Dickey RN  Outcome: Met This Shift  12/29/2020 1224 by Caleb Fu RN  Outcome: Met This Shift     Problem: Falls - Risk of:  Goal: Absence of physical injury  Description: Absence of physical injury  12/29/2020 2033 by Marian Dickey RN  Outcome: Met This Shift  12/29/2020 1224 by Caleb Fu RN  Outcome: Met This Shift     Problem: Pain:  Goal: Control of acute pain  Description: Control of acute pain  12/29/2020 2033 by Marian Dickey RN  Outcome: Not Met This Shift  12/29/2020 1224 by Caleb Fu RN  Outcome: Met This Shift     Problem: Skin Integrity:  Goal: Absence of new skin breakdown  Description: Absence of new skin breakdown  12/29/2020 2033 by Marian Dickey RN  Outcome: Not Met This Shift  12/29/2020 1224 by Caleb Fu RN  Outcome: Met This Shift

## 2020-12-31 LAB
ALBUMIN SERPL-MCNC: 3.7 G/DL (ref 3.5–5.2)
ALP BLD-CCNC: 77 U/L (ref 35–104)
ALT SERPL-CCNC: 13 U/L (ref 0–32)
ANION GAP SERPL CALCULATED.3IONS-SCNC: 11 MMOL/L (ref 7–16)
AST SERPL-CCNC: 14 U/L (ref 0–31)
BILIRUB SERPL-MCNC: 0.3 MG/DL (ref 0–1.2)
BUN BLDV-MCNC: 10 MG/DL (ref 6–20)
CALCIUM SERPL-MCNC: 8.9 MG/DL (ref 8.6–10.2)
CHLORIDE BLD-SCNC: 109 MMOL/L (ref 98–107)
CO2: 24 MMOL/L (ref 22–29)
CREAT SERPL-MCNC: 0.7 MG/DL (ref 0.5–1)
GFR AFRICAN AMERICAN: >60
GFR NON-AFRICAN AMERICAN: >60 ML/MIN/1.73
GLUCOSE BLD-MCNC: 82 MG/DL (ref 74–99)
HCT VFR BLD CALC: 34.2 % (ref 34–48)
HEMOGLOBIN: 10.4 G/DL (ref 11.5–15.5)
MAGNESIUM: 2.1 MG/DL (ref 1.6–2.6)
MCH RBC QN AUTO: 28.2 PG (ref 26–35)
MCHC RBC AUTO-ENTMCNC: 30.4 % (ref 32–34.5)
MCV RBC AUTO: 92.7 FL (ref 80–99.9)
PDW BLD-RTO: 15.1 FL (ref 11.5–15)
PHOSPHORUS: 3.6 MG/DL (ref 2.5–4.5)
PLATELET # BLD: 304 E9/L (ref 130–450)
PMV BLD AUTO: 11.2 FL (ref 7–12)
POTASSIUM REFLEX MAGNESIUM: 4.2 MMOL/L (ref 3.5–5)
RBC # BLD: 3.69 E12/L (ref 3.5–5.5)
SODIUM BLD-SCNC: 144 MMOL/L (ref 132–146)
TOTAL PROTEIN: 6.5 G/DL (ref 6.4–8.3)
WBC # BLD: 11.3 E9/L (ref 4.5–11.5)

## 2020-12-31 PROCEDURE — 1200000000 HC SEMI PRIVATE

## 2020-12-31 PROCEDURE — 84100 ASSAY OF PHOSPHORUS: CPT

## 2020-12-31 PROCEDURE — 94669 MECHANICAL CHEST WALL OSCILL: CPT

## 2020-12-31 PROCEDURE — 85027 COMPLETE CBC AUTOMATED: CPT

## 2020-12-31 PROCEDURE — 94640 AIRWAY INHALATION TREATMENT: CPT

## 2020-12-31 PROCEDURE — 80053 COMPREHEN METABOLIC PANEL: CPT

## 2020-12-31 PROCEDURE — 2580000003 HC RX 258: Performed by: STUDENT IN AN ORGANIZED HEALTH CARE EDUCATION/TRAINING PROGRAM

## 2020-12-31 PROCEDURE — 6360000002 HC RX W HCPCS: Performed by: STUDENT IN AN ORGANIZED HEALTH CARE EDUCATION/TRAINING PROGRAM

## 2020-12-31 PROCEDURE — 83735 ASSAY OF MAGNESIUM: CPT

## 2020-12-31 PROCEDURE — 97530 THERAPEUTIC ACTIVITIES: CPT

## 2020-12-31 PROCEDURE — 97535 SELF CARE MNGMENT TRAINING: CPT

## 2020-12-31 PROCEDURE — 6370000000 HC RX 637 (ALT 250 FOR IP): Performed by: STUDENT IN AN ORGANIZED HEALTH CARE EDUCATION/TRAINING PROGRAM

## 2020-12-31 PROCEDURE — 36415 COLL VENOUS BLD VENIPUNCTURE: CPT

## 2020-12-31 RX ADMIN — ACETAMINOPHEN 1000 MG: 500 TABLET ORAL at 13:35

## 2020-12-31 RX ADMIN — SENNOSIDES AND DOCUSATE SODIUM 1 TABLET: 8.6; 5 TABLET ORAL at 20:49

## 2020-12-31 RX ADMIN — ALBUTEROL SULFATE 2.5 MG: 2.5 SOLUTION RESPIRATORY (INHALATION) at 16:13

## 2020-12-31 RX ADMIN — ACETAMINOPHEN 1000 MG: 500 TABLET ORAL at 08:39

## 2020-12-31 RX ADMIN — CALCIUM CARBONATE 500 MG: 500 TABLET, CHEWABLE ORAL at 20:49

## 2020-12-31 RX ADMIN — METHOCARBAMOL TABLETS 1500 MG: 750 TABLET, COATED ORAL at 17:23

## 2020-12-31 RX ADMIN — GABAPENTIN 100 MG: 100 CAPSULE ORAL at 13:35

## 2020-12-31 RX ADMIN — CALCIUM CARBONATE 500 MG: 500 TABLET, CHEWABLE ORAL at 13:36

## 2020-12-31 RX ADMIN — Medication 10 ML: at 08:40

## 2020-12-31 RX ADMIN — ALBUTEROL SULFATE 2.5 MG: 2.5 SOLUTION RESPIRATORY (INHALATION) at 09:12

## 2020-12-31 RX ADMIN — PANTOPRAZOLE SODIUM 40 MG: 40 TABLET, DELAYED RELEASE ORAL at 05:13

## 2020-12-31 RX ADMIN — GABAPENTIN 100 MG: 100 CAPSULE ORAL at 08:39

## 2020-12-31 RX ADMIN — METHOCARBAMOL TABLETS 1500 MG: 750 TABLET, COATED ORAL at 20:49

## 2020-12-31 RX ADMIN — ACETAMINOPHEN 1000 MG: 500 TABLET ORAL at 20:49

## 2020-12-31 RX ADMIN — GABAPENTIN 100 MG: 100 CAPSULE ORAL at 20:50

## 2020-12-31 RX ADMIN — SENNOSIDES AND DOCUSATE SODIUM 1 TABLET: 8.6; 5 TABLET ORAL at 08:39

## 2020-12-31 RX ADMIN — ENOXAPARIN SODIUM 30 MG: 30 INJECTION SUBCUTANEOUS at 08:39

## 2020-12-31 RX ADMIN — METHOCARBAMOL TABLETS 1500 MG: 750 TABLET, COATED ORAL at 13:35

## 2020-12-31 RX ADMIN — CALCIUM CARBONATE 500 MG: 500 TABLET, CHEWABLE ORAL at 08:39

## 2020-12-31 RX ADMIN — ALBUTEROL SULFATE 2.5 MG: 2.5 SOLUTION RESPIRATORY (INHALATION) at 12:36

## 2020-12-31 ASSESSMENT — PAIN SCALES - GENERAL
PAINLEVEL_OUTOF10: 0
PAINLEVEL_OUTOF10: 4
PAINLEVEL_OUTOF10: 4

## 2020-12-31 NOTE — CARE COORDINATION
12/31/2020 social work transition of care planning  Pt plan is home, family to transport at discharge. Pt 24/24.   Electronically signed by CROW Le on 12/31/2020 at 11:28 AM

## 2020-12-31 NOTE — PROGRESS NOTES
Physical Therapy  Facility/Department: [facility]  Daily Treatment Note  NAME: Eugenia Myles  : 1970  MRN: [id number]    Date of Service: 2020       Referring Provider:  Issac Whitten MD       Evaluating PT:  Diandra Reed PT, DPT [id number]     Room #:  4517-I  Diagnosis:  Traumatic closed displaced fractures of ribs  Precautions: Falls, R rib 3-8 fxs, Epidural  Procedure/Surgery:  None  PMHx/PSHx:  Asthma, ADHD, fibromyalgia   Equipment Needs:  None     SUBJECTIVE:     Pt lives with boyfriend and 2 children in a 2 story home, 1st floor setup with 3 stairs to enter and no rail. Pt ambulated with no device and was independent PTA.     OBJECTIVE:    Initial Evaluation  Date: 20 Treatment  2020 Short Term/ Long Term   Goals   AM-PAC 6 Clicks 60/57 51/58     Was pt agreeable to Eval/treatment? Yes Yes     Does pt have pain?  6-7/10 R rib pain No c/o pain at rest     Bed Mobility  Rolling: NT  Supine to sit: ModA x 2 with HOB elevated  Sit to supine: NT  Scooting: ModA Independent   Mod Independent   Transfers Sit to stand: Juan Carlos  Stand to sit: Juan Carlos  Stand pivot: Juan Carlos no device Independent   Independent   Ambulation   75 feet with Juan Carlos no device 500' Independent   >400 feet Independently   Stair negotiation: ascended and descended NT 4 stairs Modified Independent   >10 steps with 1 rail Mod Independent   ROM BUE:  Defer to OT note  BLE:  WNL       Strength BUE:  Defer to OT note  BLE:  4/5   Increase by 1/3 MMT grade   Balance Sitting EOB:  Juan Carlos dynamic  Dynamic Standing:  Juan Carlos no device Sitting EOB:  Independent    Dynamic Standing: Independent   Sitting EOB:  Independent  Dynamic Standing:  Independent      Pt is A & O x 4  Sensation:  WNL  Edema:  None           Vitals:  Spo2 97 % while ambulating        Patient education  Pt educated on role of PT, pursed lip breathing and splinting of ribs with soft quilt    Patient response to education:   Pt verbalized understanding Pt demonstrated skill Pt requires further education in this area   x x      ASSESSMENT:    Comments:  Pt received in supine agreeable to PT. Pt performing all functional mobility independently. Pt ambulates with steady gait, and had no LOB during ambulation or during stair negotiation. Pt given quilt taped for splinting needs when coughing or sneezing. Pt educated on pursed lip breathing and benefits of mobilization/importance of OOB. Pt with no further skilled acute PT needs. Will discontinue PT.   Gait team added     Treatment:  Patient practiced and was instructed in the following treatment:     Bed mobility- verbal cues to facilitate independence   Pursed lip breathing/splinting    PLAN:      PLAN OF CARE:    Discontinue PT        Time in  1109  Time out  1119    Total Treatment Time  10 minutes     CPT codes:  [] Gait training 81188 0 minutes  [] Manual therapy 09919 0 minutes  [x] Therapeutic activities 75493 10 minutes  [] Therapeutic exercises 78017 0 minutes  [] Neuromuscular reeducation 23792 0 minutes    Ute Chinchilla PT, DPT  [id number]

## 2020-12-31 NOTE — PROGRESS NOTES
Occupational Therapy  OT BEDSIDE TREATMENT NOTE      Date:2020  Patient Name: Mami Mann  MRN: [id number]  : 1970  Room: [address] [place] [address]-A     Referring Provider: Trevon Marcos MD     Evaluating OT: Isai Syed OTR/L [id number]        AM-PAC Daily Activity Raw Score: 22/24     Recommended Adaptive Equipment: shower seat for energy conservation.         Diagnosis: Trauma: Fall on ice     Pertinent Medical History: ADHD, Anemia, Anxiety, Arthritis, Asthma, CTS, Fibromyalgia     Precautions:  Falls, R rib fx 3-8, epidural      Home Living: Pt lives with her significant other and her 2 children in a 2 story home with 3 step(s) to enter and no rail(s); bed/bath on first floor. Pt reports she can stay on first floor. Bathroom setup: tub/shower. Lower commode seat. Equipment owned: no DME     Prior Level of Function: IND with ADLs;  IND with IADLs. No device for ambulation  Driving: yes  Occupation: works as a massage therapist for [employer]     Pain Level: pt c/o 3/10 right rib pain        Cognition: A&O: 4/4    Follows 1-2 step commands appropriately.                Memory: good              Comprehension good              Problem solving: good              Judgement/safety: good                 Communication skills: WFL              Vision: WFL                     Glasses:reading                                                         Hearing: WFL                   Functional Assessment    Initial Eval Status  Date:  Treatment Status  Date:  STG=LTG  3-5 days   Feeding S; set up  Ind                       IND  while seated up in chair to increase activity tolerance         Grooming Min A  Ind   standing                       Rene   while stading sink level requiring no device for balance and demonstrating G tolerance       UB dressing/bathing Mod A Ind  Seated EOB                        Rene         LB dressing/bathing Mod A  seated SBA  To don/doff socks seated EOB bringing foot onto bed Rene   using AE as needed for safe reach/ energy conservation        Toileting NT  Ind- clothing management and hygiene                       Rene      Bed Mobility  Supine to sit: Mod A+2 with HOB elevated     Sit to supine: NT  Per last tx  Educated pt on technique                        Rene  in prep of ADL tasks & transfers   Functional Transfers Sit to stand: Min A from bed/lower chair     Stand to sit: Min A Ind- sit<->stand                        Rene  sit<>stand/functional bathroom transfers using AD/DME as needed for balance and safety   Functional Mobility Min A Ind  Home distance no AD                       Rene   functional/bathroom mobility using AD as needed & demonstrating G safety      Balance Sitting:     Static:  S    Dynamic:S  Standing: Min A Sitting:     Static:  Ind    Dynamic: Ind  Standing: Ind  Rene dynamic sitting balance; Rene dynamic standing balance  during ADL tasks & transfers   Endurance/Activity Tolerance    F tolerance with moderate activity.   Good G   tolerance with moderate activity/self care routine   Visual/  Perceptual    WFL                           Comments: Upon arrival pt seated on commode. Discussed home set up with pt, giving suggestions to increase safety at discharge. Educated pt on energy conservation/work simplification techniques. At end of session pt left seated in bedside chair, call light within reach. · Pt has made good progress towards set goals.      · Continue with current plan of care    Treatment Time In: 10:55            Treatment Time Out: 10:09             Treatment Charges: Mins Units   Ther Ex  81029     Manual Therapy 57276     Thera Activities 40948     ADL/Home Mgt 31610 14 1   Neuro Re-ed 23917     Group Therapy      Orthotic manage/training  82797     Non-Billable Time     Total Timed Treatment 14 1       Jim 162, Stephan 86

## 2021-01-01 LAB
ALBUMIN SERPL-MCNC: 3.6 G/DL (ref 3.5–5.2)
ALP BLD-CCNC: 70 U/L (ref 35–104)
ALT SERPL-CCNC: 11 U/L (ref 0–32)
ANION GAP SERPL CALCULATED.3IONS-SCNC: 8 MMOL/L (ref 7–16)
AST SERPL-CCNC: 13 U/L (ref 0–31)
BILIRUB SERPL-MCNC: 0.2 MG/DL (ref 0–1.2)
BUN BLDV-MCNC: 10 MG/DL (ref 6–20)
CALCIUM SERPL-MCNC: 8.8 MG/DL (ref 8.6–10.2)
CHLORIDE BLD-SCNC: 106 MMOL/L (ref 98–107)
CO2: 27 MMOL/L (ref 22–29)
CREAT SERPL-MCNC: 0.7 MG/DL (ref 0.5–1)
GFR AFRICAN AMERICAN: >60
GFR NON-AFRICAN AMERICAN: >60 ML/MIN/1.73
GLUCOSE BLD-MCNC: 68 MG/DL (ref 74–99)
HCT VFR BLD CALC: 32.4 % (ref 34–48)
HEMOGLOBIN: 9.9 G/DL (ref 11.5–15.5)
MAGNESIUM: 2 MG/DL (ref 1.6–2.6)
MCH RBC QN AUTO: 28.7 PG (ref 26–35)
MCHC RBC AUTO-ENTMCNC: 30.6 % (ref 32–34.5)
MCV RBC AUTO: 93.9 FL (ref 80–99.9)
PDW BLD-RTO: 15.1 FL (ref 11.5–15)
PHOSPHORUS: 3.6 MG/DL (ref 2.5–4.5)
PLATELET # BLD: 291 E9/L (ref 130–450)
PMV BLD AUTO: 11 FL (ref 7–12)
POTASSIUM REFLEX MAGNESIUM: 4.3 MMOL/L (ref 3.5–5)
RBC # BLD: 3.45 E12/L (ref 3.5–5.5)
SODIUM BLD-SCNC: 141 MMOL/L (ref 132–146)
TOTAL PROTEIN: 6 G/DL (ref 6.4–8.3)
WBC # BLD: 9.2 E9/L (ref 4.5–11.5)

## 2021-01-01 PROCEDURE — 85027 COMPLETE CBC AUTOMATED: CPT

## 2021-01-01 PROCEDURE — 94669 MECHANICAL CHEST WALL OSCILL: CPT

## 2021-01-01 PROCEDURE — 6370000000 HC RX 637 (ALT 250 FOR IP): Performed by: STUDENT IN AN ORGANIZED HEALTH CARE EDUCATION/TRAINING PROGRAM

## 2021-01-01 PROCEDURE — 83735 ASSAY OF MAGNESIUM: CPT

## 2021-01-01 PROCEDURE — 2700000000 HC OXYGEN THERAPY PER DAY

## 2021-01-01 PROCEDURE — 94640 AIRWAY INHALATION TREATMENT: CPT

## 2021-01-01 PROCEDURE — 1200000000 HC SEMI PRIVATE

## 2021-01-01 PROCEDURE — 6360000002 HC RX W HCPCS: Performed by: STUDENT IN AN ORGANIZED HEALTH CARE EDUCATION/TRAINING PROGRAM

## 2021-01-01 PROCEDURE — 6360000002 HC RX W HCPCS

## 2021-01-01 PROCEDURE — 2580000003 HC RX 258: Performed by: STUDENT IN AN ORGANIZED HEALTH CARE EDUCATION/TRAINING PROGRAM

## 2021-01-01 PROCEDURE — 36415 COLL VENOUS BLD VENIPUNCTURE: CPT

## 2021-01-01 PROCEDURE — 80053 COMPREHEN METABOLIC PANEL: CPT

## 2021-01-01 PROCEDURE — 99232 SBSQ HOSP IP/OBS MODERATE 35: CPT | Performed by: SURGERY

## 2021-01-01 PROCEDURE — 84100 ASSAY OF PHOSPHORUS: CPT

## 2021-01-01 PROCEDURE — 87186 SC STD MICRODIL/AGAR DIL: CPT

## 2021-01-01 PROCEDURE — 87088 URINE BACTERIA CULTURE: CPT

## 2021-01-01 RX ORDER — OXYCODONE HYDROCHLORIDE 10 MG/1
10 TABLET ORAL EVERY 4 HOURS PRN
Status: DISCONTINUED | OUTPATIENT
Start: 2021-01-01 | End: 2021-01-03 | Stop reason: HOSPADM

## 2021-01-01 RX ORDER — OXYCODONE HYDROCHLORIDE 5 MG/1
5 TABLET ORAL EVERY 4 HOURS PRN
Status: DISCONTINUED | OUTPATIENT
Start: 2021-01-01 | End: 2021-01-03 | Stop reason: HOSPADM

## 2021-01-01 RX ORDER — NITROFURANTOIN 25; 75 MG/1; MG/1
100 CAPSULE ORAL EVERY 12 HOURS SCHEDULED
Status: DISCONTINUED | OUTPATIENT
Start: 2021-01-01 | End: 2021-01-03 | Stop reason: HOSPADM

## 2021-01-01 RX ORDER — GABAPENTIN 300 MG/1
300 CAPSULE ORAL 3 TIMES DAILY
Status: DISCONTINUED | OUTPATIENT
Start: 2021-01-01 | End: 2021-01-02

## 2021-01-01 RX ORDER — GABAPENTIN 100 MG/1
200 CAPSULE ORAL 3 TIMES DAILY
Status: DISCONTINUED | OUTPATIENT
Start: 2021-01-01 | End: 2021-01-01

## 2021-01-01 RX ADMIN — Medication 10 ML: at 09:27

## 2021-01-01 RX ADMIN — POLYETHYLENE GLYCOL 3350 17 G: 17 POWDER, FOR SOLUTION ORAL at 09:25

## 2021-01-01 RX ADMIN — METHOCARBAMOL TABLETS 1500 MG: 750 TABLET, COATED ORAL at 13:27

## 2021-01-01 RX ADMIN — METHOCARBAMOL TABLETS 1500 MG: 750 TABLET, COATED ORAL at 20:27

## 2021-01-01 RX ADMIN — ACETAMINOPHEN 1000 MG: 500 TABLET ORAL at 20:27

## 2021-01-01 RX ADMIN — OXYCODONE HYDROCHLORIDE 10 MG: 10 TABLET ORAL at 20:27

## 2021-01-01 RX ADMIN — OXYCODONE HYDROCHLORIDE 10 MG: 10 TABLET ORAL at 12:18

## 2021-01-01 RX ADMIN — METHOCARBAMOL TABLETS 1500 MG: 750 TABLET, COATED ORAL at 16:27

## 2021-01-01 RX ADMIN — METHOCARBAMOL TABLETS 1500 MG: 750 TABLET, COATED ORAL at 09:25

## 2021-01-01 RX ADMIN — CALCIUM CARBONATE 500 MG: 500 TABLET, CHEWABLE ORAL at 09:26

## 2021-01-01 RX ADMIN — SENNOSIDES AND DOCUSATE SODIUM 1 TABLET: 8.6; 5 TABLET ORAL at 20:32

## 2021-01-01 RX ADMIN — GABAPENTIN 200 MG: 100 CAPSULE ORAL at 13:27

## 2021-01-01 RX ADMIN — GABAPENTIN 100 MG: 100 CAPSULE ORAL at 09:25

## 2021-01-01 RX ADMIN — HYDROMORPHONE HYDROCHLORIDE 0.5 MG: 1 INJECTION, SOLUTION INTRAMUSCULAR; INTRAVENOUS; SUBCUTANEOUS at 19:29

## 2021-01-01 RX ADMIN — CALCIUM CARBONATE 500 MG: 500 TABLET, CHEWABLE ORAL at 20:27

## 2021-01-01 RX ADMIN — CALCIUM CARBONATE 500 MG: 500 TABLET, CHEWABLE ORAL at 13:27

## 2021-01-01 RX ADMIN — ENOXAPARIN SODIUM 30 MG: 30 INJECTION SUBCUTANEOUS at 20:28

## 2021-01-01 RX ADMIN — ACETAMINOPHEN 1000 MG: 500 TABLET ORAL at 00:43

## 2021-01-01 RX ADMIN — ACETAMINOPHEN 1000 MG: 500 TABLET ORAL at 15:58

## 2021-01-01 RX ADMIN — PANTOPRAZOLE SODIUM 40 MG: 40 TABLET, DELAYED RELEASE ORAL at 06:53

## 2021-01-01 RX ADMIN — HYDROMORPHONE HYDROCHLORIDE 1 MG: 1 INJECTION, SOLUTION INTRAMUSCULAR; INTRAVENOUS; SUBCUTANEOUS at 21:28

## 2021-01-01 RX ADMIN — SODIUM CHLORIDE, PRESERVATIVE FREE 10 ML: 5 INJECTION INTRAVENOUS at 19:30

## 2021-01-01 RX ADMIN — NITROFURANTOIN (MONOHYDRATE/MACROCRYSTALS) 100 MG: 75; 25 CAPSULE ORAL at 20:27

## 2021-01-01 RX ADMIN — ACETAMINOPHEN 1000 MG: 500 TABLET ORAL at 09:25

## 2021-01-01 RX ADMIN — OXYCODONE HYDROCHLORIDE 10 MG: 10 TABLET ORAL at 16:24

## 2021-01-01 RX ADMIN — ALBUTEROL SULFATE 2.5 MG: 2.5 SOLUTION RESPIRATORY (INHALATION) at 08:41

## 2021-01-01 RX ADMIN — ALBUTEROL SULFATE 2.5 MG: 2.5 SOLUTION RESPIRATORY (INHALATION) at 11:57

## 2021-01-01 RX ADMIN — NITROFURANTOIN (MONOHYDRATE/MACROCRYSTALS) 100 MG: 75; 25 CAPSULE ORAL at 11:15

## 2021-01-01 RX ADMIN — GABAPENTIN 300 MG: 300 CAPSULE ORAL at 20:27

## 2021-01-01 RX ADMIN — Medication 10 ML: at 20:28

## 2021-01-01 RX ADMIN — SENNOSIDES AND DOCUSATE SODIUM 1 TABLET: 8.6; 5 TABLET ORAL at 09:26

## 2021-01-01 ASSESSMENT — PAIN DESCRIPTION - FREQUENCY
FREQUENCY: CONTINUOUS

## 2021-01-01 ASSESSMENT — PAIN SCALES - GENERAL
PAINLEVEL_OUTOF10: 0
PAINLEVEL_OUTOF10: 0
PAINLEVEL_OUTOF10: 8
PAINLEVEL_OUTOF10: 0
PAINLEVEL_OUTOF10: 10
PAINLEVEL_OUTOF10: 5
PAINLEVEL_OUTOF10: 5

## 2021-01-01 ASSESSMENT — PAIN DESCRIPTION - DESCRIPTORS
DESCRIPTORS: ACHING;CONSTANT;THROBBING;STABBING
DESCRIPTORS: ACHING;DULL;DISCOMFORT
DESCRIPTORS: CONSTANT;THROBBING;STABBING
DESCRIPTORS: ACHING;CONSTANT;STABBING

## 2021-01-01 ASSESSMENT — PAIN DESCRIPTION - PROGRESSION
CLINICAL_PROGRESSION: NOT CHANGED
CLINICAL_PROGRESSION: NOT CHANGED
CLINICAL_PROGRESSION: GRADUALLY WORSENING

## 2021-01-01 ASSESSMENT — PAIN DESCRIPTION - ONSET
ONSET: ON-GOING

## 2021-01-01 ASSESSMENT — PAIN DESCRIPTION - ORIENTATION
ORIENTATION: RIGHT

## 2021-01-01 ASSESSMENT — PAIN DESCRIPTION - LOCATION
LOCATION: RIB CAGE

## 2021-01-01 ASSESSMENT — PAIN DESCRIPTION - PAIN TYPE
TYPE: ACUTE PAIN

## 2021-01-01 ASSESSMENT — PAIN - FUNCTIONAL ASSESSMENT: PAIN_FUNCTIONAL_ASSESSMENT: PREVENTS OR INTERFERES WITH ALL ACTIVE AND SOME PASSIVE ACTIVITIES

## 2021-01-01 NOTE — PROGRESS NOTES
Epidural catheter removed per order, blue tip intact, site clean and dry, 21ml fent/bupiv wasted with J.King BARNARD.

## 2021-01-01 NOTE — PLAN OF CARE
Problem: Pain:  Goal: Pain level will decrease  Description: Pain level will decrease  1/1/2021 0025 by Singh Marx RN  Outcome: Not Met This Shift  12/31/2020 1139 by Kaela Lisa RN  Outcome: Met This Shift  Goal: Control of acute pain  Description: Control of acute pain  1/1/2021 0025 by Singh Marx RN  Outcome: Not Met This Shift  12/31/2020 1139 by Kaela Lisa RN  Outcome: Met This Shift  Goal: Control of chronic pain  Description: Control of chronic pain  1/1/2021 0025 by Singh Marx RN  Outcome: Not Met This Shift  12/31/2020 1139 by Kaela Lisa RN  Outcome: Met This Shift     Problem: Skin Integrity:  Goal: Will show no infection signs and symptoms  Description: Will show no infection signs and symptoms  1/1/2021 0025 by Singh Marx RN  Outcome: Not Met This Shift  12/31/2020 1139 by Kaela Lisa RN  Outcome: Met This Shift  Goal: Absence of new skin breakdown  Description: Absence of new skin breakdown  1/1/2021 0025 by Singh Marx RN  Outcome: Not Met This Shift  12/31/2020 1139 by Kaela Lisa RN  Outcome: Met This Shift     Problem: Falls - Risk of:  Goal: Will remain free from falls  Description: Will remain free from falls  1/1/2021 0025 by Singh Marx RN  Outcome: Not Met This Shift  12/31/2020 1139 by Kaela Lisa RN  Outcome: Met This Shift  Goal: Absence of physical injury  Description: Absence of physical injury  1/1/2021 0025 by Singh Marx RN  Outcome: Not Met This Shift  12/31/2020 1139 by Kaela Lisa RN  Outcome: Met This Shift     Problem: Musculor/Skeletal Functional Status  Goal: Highest potential functional level  1/1/2021 0025 by Singh Marx RN  Outcome: Not Met This Shift  12/31/2020 1139 by Kaela Lisa RN  Outcome: Met This Shift  Goal: Absence of falls  1/1/2021 0025 by Singh Marx RN  Outcome: Not Met This Shift  12/31/2020 1139 by Kaela Lisa RN  Outcome: Met This Shift     Problem: Breathing Pattern - Ineffective:  Goal: Ability to achieve and maintain a regular respiratory rate will improve  Description: Ability to achieve and maintain a regular respiratory rate will improve  1/1/2021 0025 by Zabrina King RN  Outcome: Not Met This Shift  12/31/2020 1139 by Vanesa Saavedra RN  Outcome: Ongoing

## 2021-01-01 NOTE — PROGRESS NOTES
TRAUMA SURGERY  ATTENDING PROGRESS NOTE      CC: slip and fall     S:   doing well pain is controlled     O:   @BP (!) 108/58   Pulse 82   Temp 97.3 °F (36.3 °C) (Temporal)   Resp 18   Ht 5' 7\" (1.702 m)   Wt 202 lb 9.6 oz (91.9 kg)   SpO2 95%   BMI 31.73 kg/m² @    Gen - no apparent distress   Neuro - Awake, alert, attentive    HEENT - PERRL 3mm conj pink   Lungs - non labored, BS clear b/l    Heart - RR no extra heart sounds    Abdomen - SNT   Spine -   Non tender C/T/L  Ext- rom wnl NVI     A/P: fall with multiple R sided rib fractures,       Active Problems:    Traumatic closed displaced fracture of multiple ribs  Resolved Problems:    * No resolved hospital problems. *       Status post fall  Multiple right-sided rib fractures--pain control (scheduled Tylenol/Robaxin/gabapentin; PRN oxycodone; epidural removed today,   History of fibromyalgia/chronic back pain--continue pain meds  Hypoalbuminemia--diet as tolerated  PT/OT evals doing well plan possible d/c home tomorrow.    DVT risk--PCDs/lovenox    Karen Kwon MD FACS

## 2021-01-01 NOTE — PROGRESS NOTES
1518 This nurse entered patient room and found patient crying, stating, \"I am in so much pain\". Epidural was removed today. This nurse encouraged patient to use spirometer frequently, deep breathe and cough, and increase activity. 1618 - pain medication given per request as well as muscle relaxer. Patient continues crying. 1718 - patient appears to be more comfortable.

## 2021-01-02 LAB
ALBUMIN SERPL-MCNC: 3.8 G/DL (ref 3.5–5.2)
ALP BLD-CCNC: 82 U/L (ref 35–104)
ALT SERPL-CCNC: 16 U/L (ref 0–32)
ANION GAP SERPL CALCULATED.3IONS-SCNC: 11 MMOL/L (ref 7–16)
AST SERPL-CCNC: 20 U/L (ref 0–31)
BILIRUB SERPL-MCNC: 0.2 MG/DL (ref 0–1.2)
BUN BLDV-MCNC: 12 MG/DL (ref 6–20)
CALCIUM SERPL-MCNC: 9.2 MG/DL (ref 8.6–10.2)
CHLORIDE BLD-SCNC: 103 MMOL/L (ref 98–107)
CO2: 24 MMOL/L (ref 22–29)
CREAT SERPL-MCNC: 0.7 MG/DL (ref 0.5–1)
GFR AFRICAN AMERICAN: >60
GFR NON-AFRICAN AMERICAN: >60 ML/MIN/1.73
GLUCOSE BLD-MCNC: 87 MG/DL (ref 74–99)
HCT VFR BLD CALC: 34.8 % (ref 34–48)
HEMOGLOBIN: 11 G/DL (ref 11.5–15.5)
MAGNESIUM: 2 MG/DL (ref 1.6–2.6)
MCH RBC QN AUTO: 28.8 PG (ref 26–35)
MCHC RBC AUTO-ENTMCNC: 31.6 % (ref 32–34.5)
MCV RBC AUTO: 91.1 FL (ref 80–99.9)
PDW BLD-RTO: 14.9 FL (ref 11.5–15)
PHOSPHORUS: 4.3 MG/DL (ref 2.5–4.5)
PLATELET # BLD: 343 E9/L (ref 130–450)
PMV BLD AUTO: 10.4 FL (ref 7–12)
POTASSIUM REFLEX MAGNESIUM: 4.3 MMOL/L (ref 3.5–5)
RBC # BLD: 3.82 E12/L (ref 3.5–5.5)
SODIUM BLD-SCNC: 138 MMOL/L (ref 132–146)
TOTAL PROTEIN: 6.9 G/DL (ref 6.4–8.3)
WBC # BLD: 7.8 E9/L (ref 4.5–11.5)

## 2021-01-02 PROCEDURE — 2580000003 HC RX 258: Performed by: STUDENT IN AN ORGANIZED HEALTH CARE EDUCATION/TRAINING PROGRAM

## 2021-01-02 PROCEDURE — 6370000000 HC RX 637 (ALT 250 FOR IP): Performed by: STUDENT IN AN ORGANIZED HEALTH CARE EDUCATION/TRAINING PROGRAM

## 2021-01-02 PROCEDURE — 1200000000 HC SEMI PRIVATE

## 2021-01-02 PROCEDURE — 80053 COMPREHEN METABOLIC PANEL: CPT

## 2021-01-02 PROCEDURE — 85027 COMPLETE CBC AUTOMATED: CPT

## 2021-01-02 PROCEDURE — 6360000002 HC RX W HCPCS: Performed by: STUDENT IN AN ORGANIZED HEALTH CARE EDUCATION/TRAINING PROGRAM

## 2021-01-02 PROCEDURE — 94640 AIRWAY INHALATION TREATMENT: CPT

## 2021-01-02 PROCEDURE — 99232 SBSQ HOSP IP/OBS MODERATE 35: CPT | Performed by: SURGERY

## 2021-01-02 PROCEDURE — 83735 ASSAY OF MAGNESIUM: CPT

## 2021-01-02 PROCEDURE — 84100 ASSAY OF PHOSPHORUS: CPT

## 2021-01-02 PROCEDURE — 6370000000 HC RX 637 (ALT 250 FOR IP): Performed by: SURGERY

## 2021-01-02 PROCEDURE — 2700000000 HC OXYGEN THERAPY PER DAY

## 2021-01-02 PROCEDURE — 36415 COLL VENOUS BLD VENIPUNCTURE: CPT

## 2021-01-02 RX ORDER — GABAPENTIN 400 MG/1
400 CAPSULE ORAL 3 TIMES DAILY
Status: DISCONTINUED | OUTPATIENT
Start: 2021-01-02 | End: 2021-01-03 | Stop reason: HOSPADM

## 2021-01-02 RX ORDER — BISACODYL 10 MG
10 SUPPOSITORY, RECTAL RECTAL DAILY
Status: DISCONTINUED | OUTPATIENT
Start: 2021-01-02 | End: 2021-01-03 | Stop reason: HOSPADM

## 2021-01-02 RX ADMIN — HYDROMORPHONE HYDROCHLORIDE 1 MG: 1 INJECTION, SOLUTION INTRAMUSCULAR; INTRAVENOUS; SUBCUTANEOUS at 16:00

## 2021-01-02 RX ADMIN — GABAPENTIN 400 MG: 400 CAPSULE ORAL at 21:16

## 2021-01-02 RX ADMIN — OXYCODONE HYDROCHLORIDE 10 MG: 10 TABLET ORAL at 01:38

## 2021-01-02 RX ADMIN — METHOCARBAMOL TABLETS 1500 MG: 750 TABLET, COATED ORAL at 16:00

## 2021-01-02 RX ADMIN — HYDROMORPHONE HYDROCHLORIDE 1 MG: 1 INJECTION, SOLUTION INTRAMUSCULAR; INTRAVENOUS; SUBCUTANEOUS at 10:24

## 2021-01-02 RX ADMIN — METHOCARBAMOL TABLETS 1500 MG: 750 TABLET, COATED ORAL at 21:16

## 2021-01-02 RX ADMIN — GABAPENTIN 400 MG: 400 CAPSULE ORAL at 13:44

## 2021-01-02 RX ADMIN — OXYCODONE HYDROCHLORIDE 5 MG: 5 TABLET ORAL at 11:43

## 2021-01-02 RX ADMIN — Medication 10 ML: at 21:16

## 2021-01-02 RX ADMIN — CALCIUM CARBONATE 500 MG: 500 TABLET, CHEWABLE ORAL at 08:53

## 2021-01-02 RX ADMIN — HYDROMORPHONE HYDROCHLORIDE 1 MG: 1 INJECTION, SOLUTION INTRAMUSCULAR; INTRAVENOUS; SUBCUTANEOUS at 02:48

## 2021-01-02 RX ADMIN — ACETAMINOPHEN 1000 MG: 500 TABLET ORAL at 08:53

## 2021-01-02 RX ADMIN — CALCIUM CARBONATE 500 MG: 500 TABLET, CHEWABLE ORAL at 21:16

## 2021-01-02 RX ADMIN — HYDROMORPHONE HYDROCHLORIDE 1 MG: 1 INJECTION, SOLUTION INTRAMUSCULAR; INTRAVENOUS; SUBCUTANEOUS at 19:00

## 2021-01-02 RX ADMIN — CALCIUM CARBONATE 500 MG: 500 TABLET, CHEWABLE ORAL at 13:52

## 2021-01-02 RX ADMIN — Medication 10 ML: at 08:57

## 2021-01-02 RX ADMIN — ACETAMINOPHEN 1000 MG: 500 TABLET ORAL at 21:16

## 2021-01-02 RX ADMIN — METHOCARBAMOL TABLETS 1500 MG: 750 TABLET, COATED ORAL at 08:54

## 2021-01-02 RX ADMIN — SENNOSIDES AND DOCUSATE SODIUM 1 TABLET: 8.6; 5 TABLET ORAL at 08:53

## 2021-01-02 RX ADMIN — OXYCODONE HYDROCHLORIDE 10 MG: 10 TABLET ORAL at 17:11

## 2021-01-02 RX ADMIN — HYDROMORPHONE HYDROCHLORIDE 1 MG: 1 INJECTION, SOLUTION INTRAMUSCULAR; INTRAVENOUS; SUBCUTANEOUS at 07:38

## 2021-01-02 RX ADMIN — HYDROMORPHONE HYDROCHLORIDE 1 MG: 1 INJECTION, SOLUTION INTRAMUSCULAR; INTRAVENOUS; SUBCUTANEOUS at 04:56

## 2021-01-02 RX ADMIN — PANTOPRAZOLE SODIUM 40 MG: 40 TABLET, DELAYED RELEASE ORAL at 05:58

## 2021-01-02 RX ADMIN — ALBUTEROL SULFATE 2.5 MG: 2.5 SOLUTION RESPIRATORY (INHALATION) at 16:59

## 2021-01-02 RX ADMIN — OXYCODONE HYDROCHLORIDE 10 MG: 10 TABLET ORAL at 05:58

## 2021-01-02 RX ADMIN — ALBUTEROL SULFATE 2.5 MG: 2.5 SOLUTION RESPIRATORY (INHALATION) at 21:00

## 2021-01-02 RX ADMIN — GABAPENTIN 400 MG: 400 CAPSULE ORAL at 08:53

## 2021-01-02 RX ADMIN — ACETAMINOPHEN 1000 MG: 500 TABLET ORAL at 15:57

## 2021-01-02 RX ADMIN — ENOXAPARIN SODIUM 30 MG: 30 INJECTION SUBCUTANEOUS at 08:54

## 2021-01-02 RX ADMIN — HYDROMORPHONE HYDROCHLORIDE 1 MG: 1 INJECTION, SOLUTION INTRAMUSCULAR; INTRAVENOUS; SUBCUTANEOUS at 00:21

## 2021-01-02 RX ADMIN — METHOCARBAMOL TABLETS 1500 MG: 750 TABLET, COATED ORAL at 13:44

## 2021-01-02 RX ADMIN — SENNOSIDES AND DOCUSATE SODIUM 1 TABLET: 8.6; 5 TABLET ORAL at 21:16

## 2021-01-02 RX ADMIN — HYDROMORPHONE HYDROCHLORIDE 1 MG: 1 INJECTION, SOLUTION INTRAMUSCULAR; INTRAVENOUS; SUBCUTANEOUS at 13:44

## 2021-01-02 RX ADMIN — ENOXAPARIN SODIUM 30 MG: 30 INJECTION SUBCUTANEOUS at 21:15

## 2021-01-02 RX ADMIN — NITROFURANTOIN (MONOHYDRATE/MACROCRYSTALS) 100 MG: 75; 25 CAPSULE ORAL at 08:54

## 2021-01-02 RX ADMIN — OXYCODONE HYDROCHLORIDE 10 MG: 10 TABLET ORAL at 21:15

## 2021-01-02 ASSESSMENT — PAIN DESCRIPTION - ONSET
ONSET: ON-GOING

## 2021-01-02 ASSESSMENT — PAIN SCALES - GENERAL
PAINLEVEL_OUTOF10: 3
PAINLEVEL_OUTOF10: 8
PAINLEVEL_OUTOF10: 7
PAINLEVEL_OUTOF10: 7
PAINLEVEL_OUTOF10: 4
PAINLEVEL_OUTOF10: 8
PAINLEVEL_OUTOF10: 8
PAINLEVEL_OUTOF10: 0
PAINLEVEL_OUTOF10: 8
PAINLEVEL_OUTOF10: 4
PAINLEVEL_OUTOF10: 6
PAINLEVEL_OUTOF10: 5
PAINLEVEL_OUTOF10: 10
PAINLEVEL_OUTOF10: 0
PAINLEVEL_OUTOF10: 0

## 2021-01-02 ASSESSMENT — PAIN DESCRIPTION - LOCATION
LOCATION: RIB CAGE

## 2021-01-02 ASSESSMENT — PAIN DESCRIPTION - PAIN TYPE
TYPE: ACUTE PAIN

## 2021-01-02 ASSESSMENT — PAIN DESCRIPTION - ORIENTATION
ORIENTATION: RIGHT

## 2021-01-02 ASSESSMENT — PAIN DESCRIPTION - FREQUENCY
FREQUENCY: CONTINUOUS

## 2021-01-02 ASSESSMENT — PAIN DESCRIPTION - DESCRIPTORS
DESCRIPTORS: ACHING;CONSTANT;DISCOMFORT
DESCRIPTORS: ACHING;DISCOMFORT

## 2021-01-02 ASSESSMENT — PAIN - FUNCTIONAL ASSESSMENT
PAIN_FUNCTIONAL_ASSESSMENT: ACTIVITIES ARE NOT PREVENTED
PAIN_FUNCTIONAL_ASSESSMENT: ACTIVITIES ARE NOT PREVENTED

## 2021-01-02 ASSESSMENT — PAIN DESCRIPTION - PROGRESSION
CLINICAL_PROGRESSION: GRADUALLY IMPROVING
CLINICAL_PROGRESSION: GRADUALLY WORSENING
CLINICAL_PROGRESSION: GRADUALLY WORSENING

## 2021-01-02 NOTE — PLAN OF CARE
Problem: Pain:  Goal: Pain level will decrease  Description: Pain level will decrease  Outcome: Met This Shift     Problem: Skin Integrity:  Goal: Absence of new skin breakdown  Description: Absence of new skin breakdown  Outcome: Met This Shift     Problem: Falls - Risk of:  Goal: Will remain free from falls  Description: Will remain free from falls  Outcome: Met This Shift     Problem: Falls - Risk of:  Goal: Absence of physical injury  Description: Absence of physical injury  Outcome: Met This Shift

## 2021-01-02 NOTE — PROGRESS NOTES
TRAUMA SURGERY  ATTENDING PROGRESS NOTE      CC: slip and fall     S:  C/o of out of control pain today. ..     O:   @/79   Pulse 94   Temp 97.7 °F (36.5 °C) (Temporal)   Resp 17   Ht 5' 7\" (1.702 m)   Wt 202 lb 9.6 oz (91.9 kg)   SpO2 92%   BMI 31.73 kg/m² @    Gen - no apparent distress   Neuro - Awake, alert, attentive    HEENT - PERRL 3mm conj pink   Lungs - non labored, BS clear b/l    Heart - RR no extra heart sounds    Abdomen - SNT   Spine -   Non tender C/T/L  Ext- rom wnl NVI     A/P: fall with multiple R sided rib fractures,       Active Problems:    Traumatic closed displaced fracture of multiple ribs  Resolved Problems:    * No resolved hospital problems. *       Status post fall  Multiple right-sided rib fractures--pain control (scheduled Tylenol/Robaxin/gabapentin; PRN oxycodone; add EZPAP today   History of fibromyalgia/chronic back pain--continue pain meds  Hypoalbuminemia--diet as tolerated  PT/OT evals doing well plan possible d/c home tomorrow.    DVT risk--PCDs/lovenox    Yon Francis MD FACS

## 2021-01-03 VITALS
BODY MASS INDEX: 31.8 KG/M2 | OXYGEN SATURATION: 94 % | DIASTOLIC BLOOD PRESSURE: 72 MMHG | SYSTOLIC BLOOD PRESSURE: 130 MMHG | TEMPERATURE: 97 F | WEIGHT: 202.6 LBS | HEIGHT: 67 IN | HEART RATE: 96 BPM | RESPIRATION RATE: 18 BRPM

## 2021-01-03 LAB
ALBUMIN SERPL-MCNC: 3.9 G/DL (ref 3.5–5.2)
ALP BLD-CCNC: 89 U/L (ref 35–104)
ALT SERPL-CCNC: 23 U/L (ref 0–32)
ANION GAP SERPL CALCULATED.3IONS-SCNC: 12 MMOL/L (ref 7–16)
AST SERPL-CCNC: 31 U/L (ref 0–31)
BILIRUB SERPL-MCNC: <0.2 MG/DL (ref 0–1.2)
BUN BLDV-MCNC: 15 MG/DL (ref 6–20)
CALCIUM SERPL-MCNC: 9.4 MG/DL (ref 8.6–10.2)
CHLORIDE BLD-SCNC: 102 MMOL/L (ref 98–107)
CO2: 28 MMOL/L (ref 22–29)
CREAT SERPL-MCNC: 0.7 MG/DL (ref 0.5–1)
GFR AFRICAN AMERICAN: >60
GFR NON-AFRICAN AMERICAN: >60 ML/MIN/1.73
GLUCOSE BLD-MCNC: 79 MG/DL (ref 74–99)
HCT VFR BLD CALC: 35.7 % (ref 34–48)
HEMOGLOBIN: 10.9 G/DL (ref 11.5–15.5)
MAGNESIUM: 2.1 MG/DL (ref 1.6–2.6)
MCH RBC QN AUTO: 28.3 PG (ref 26–35)
MCHC RBC AUTO-ENTMCNC: 30.5 % (ref 32–34.5)
MCV RBC AUTO: 92.7 FL (ref 80–99.9)
ORGANISM: ABNORMAL
PDW BLD-RTO: 15 FL (ref 11.5–15)
PHOSPHORUS: 4.5 MG/DL (ref 2.5–4.5)
PLATELET # BLD: 391 E9/L (ref 130–450)
PMV BLD AUTO: 10.6 FL (ref 7–12)
POTASSIUM REFLEX MAGNESIUM: 4.5 MMOL/L (ref 3.5–5)
RBC # BLD: 3.85 E12/L (ref 3.5–5.5)
SODIUM BLD-SCNC: 142 MMOL/L (ref 132–146)
TOTAL PROTEIN: 6.8 G/DL (ref 6.4–8.3)
URINE CULTURE, ROUTINE: ABNORMAL
WBC # BLD: 8 E9/L (ref 4.5–11.5)

## 2021-01-03 PROCEDURE — 83735 ASSAY OF MAGNESIUM: CPT

## 2021-01-03 PROCEDURE — 85027 COMPLETE CBC AUTOMATED: CPT

## 2021-01-03 PROCEDURE — 6370000000 HC RX 637 (ALT 250 FOR IP): Performed by: STUDENT IN AN ORGANIZED HEALTH CARE EDUCATION/TRAINING PROGRAM

## 2021-01-03 PROCEDURE — 94640 AIRWAY INHALATION TREATMENT: CPT

## 2021-01-03 PROCEDURE — 84100 ASSAY OF PHOSPHORUS: CPT

## 2021-01-03 PROCEDURE — 6370000000 HC RX 637 (ALT 250 FOR IP): Performed by: SURGERY

## 2021-01-03 PROCEDURE — 2580000003 HC RX 258: Performed by: STUDENT IN AN ORGANIZED HEALTH CARE EDUCATION/TRAINING PROGRAM

## 2021-01-03 PROCEDURE — 6360000002 HC RX W HCPCS: Performed by: STUDENT IN AN ORGANIZED HEALTH CARE EDUCATION/TRAINING PROGRAM

## 2021-01-03 PROCEDURE — 36415 COLL VENOUS BLD VENIPUNCTURE: CPT

## 2021-01-03 PROCEDURE — 80053 COMPREHEN METABOLIC PANEL: CPT

## 2021-01-03 PROCEDURE — 99239 HOSP IP/OBS DSCHRG MGMT >30: CPT | Performed by: SURGERY

## 2021-01-03 RX ORDER — METHOCARBAMOL 750 MG/1
1500 TABLET, FILM COATED ORAL 4 TIMES DAILY
Qty: 80 TABLET | Refills: 0 | Status: SHIPPED | OUTPATIENT
Start: 2021-01-03 | End: 2021-01-12 | Stop reason: ALTCHOICE

## 2021-01-03 RX ORDER — POLYETHYLENE GLYCOL 3350 17 G/17G
17 POWDER, FOR SOLUTION ORAL DAILY
Qty: 527 G | Refills: 1 | Status: SHIPPED | OUTPATIENT
Start: 2021-01-04 | End: 2021-01-12

## 2021-01-03 RX ORDER — LIDOCAINE 4 G/G
1 PATCH TOPICAL DAILY
Qty: 7 PATCH | Refills: 1 | Status: SHIPPED | OUTPATIENT
Start: 2021-01-04 | End: 2021-01-18

## 2021-01-03 RX ORDER — NITROFURANTOIN 25; 75 MG/1; MG/1
100 CAPSULE ORAL EVERY 12 HOURS SCHEDULED
Qty: 4 CAPSULE | Refills: 0 | Status: SHIPPED | OUTPATIENT
Start: 2021-01-03 | End: 2021-01-05

## 2021-01-03 RX ORDER — OXYCODONE HYDROCHLORIDE 5 MG/1
5 TABLET ORAL EVERY 6 HOURS PRN
Qty: 28 TABLET | Refills: 0 | Status: SHIPPED | OUTPATIENT
Start: 2021-01-03 | End: 2021-01-10

## 2021-01-03 RX ADMIN — PANTOPRAZOLE SODIUM 40 MG: 40 TABLET, DELAYED RELEASE ORAL at 06:36

## 2021-01-03 RX ADMIN — GABAPENTIN 400 MG: 400 CAPSULE ORAL at 07:58

## 2021-01-03 RX ADMIN — ENOXAPARIN SODIUM 30 MG: 30 INJECTION SUBCUTANEOUS at 07:56

## 2021-01-03 RX ADMIN — NITROFURANTOIN (MONOHYDRATE/MACROCRYSTALS) 100 MG: 75; 25 CAPSULE ORAL at 00:00

## 2021-01-03 RX ADMIN — Medication 10 ML: at 08:00

## 2021-01-03 RX ADMIN — ACETAMINOPHEN 1000 MG: 500 TABLET ORAL at 04:00

## 2021-01-03 RX ADMIN — OXYCODONE HYDROCHLORIDE 10 MG: 10 TABLET ORAL at 12:06

## 2021-01-03 RX ADMIN — METHOCARBAMOL TABLETS 1500 MG: 750 TABLET, COATED ORAL at 07:59

## 2021-01-03 RX ADMIN — SENNOSIDES AND DOCUSATE SODIUM 1 TABLET: 8.6; 5 TABLET ORAL at 07:55

## 2021-01-03 RX ADMIN — HYDROMORPHONE HYDROCHLORIDE 1 MG: 1 INJECTION, SOLUTION INTRAMUSCULAR; INTRAVENOUS; SUBCUTANEOUS at 00:00

## 2021-01-03 RX ADMIN — NITROFURANTOIN (MONOHYDRATE/MACROCRYSTALS) 100 MG: 75; 25 CAPSULE ORAL at 07:58

## 2021-01-03 RX ADMIN — OXYCODONE HYDROCHLORIDE 5 MG: 5 TABLET ORAL at 04:01

## 2021-01-03 RX ADMIN — ALBUTEROL SULFATE 2.5 MG: 2.5 SOLUTION RESPIRATORY (INHALATION) at 09:10

## 2021-01-03 RX ADMIN — Medication 1000 MCG: at 07:59

## 2021-01-03 RX ADMIN — CALCIUM CARBONATE 500 MG: 500 TABLET, CHEWABLE ORAL at 07:55

## 2021-01-03 RX ADMIN — OXYCODONE HYDROCHLORIDE 10 MG: 10 TABLET ORAL at 08:01

## 2021-01-03 ASSESSMENT — PAIN DESCRIPTION - DESCRIPTORS
DESCRIPTORS: ACHING;DISCOMFORT;SHARP
DESCRIPTORS: ACHING;BURNING;DISCOMFORT
DESCRIPTORS: ACHING;CONSTANT;DISCOMFORT

## 2021-01-03 ASSESSMENT — PAIN DESCRIPTION - PROGRESSION
CLINICAL_PROGRESSION: GRADUALLY WORSENING
CLINICAL_PROGRESSION: GRADUALLY WORSENING

## 2021-01-03 ASSESSMENT — PAIN DESCRIPTION - FREQUENCY
FREQUENCY: CONTINUOUS
FREQUENCY: CONTINUOUS

## 2021-01-03 ASSESSMENT — PAIN DESCRIPTION - PAIN TYPE
TYPE: ACUTE PAIN

## 2021-01-03 ASSESSMENT — PAIN DESCRIPTION - LOCATION
LOCATION: RIB CAGE
LOCATION: RIB CAGE

## 2021-01-03 ASSESSMENT — PAIN SCALES - GENERAL
PAINLEVEL_OUTOF10: 9
PAINLEVEL_OUTOF10: 4

## 2021-01-03 ASSESSMENT — PAIN DESCRIPTION - ORIENTATION
ORIENTATION: RIGHT
ORIENTATION: RIGHT

## 2021-01-03 NOTE — PROGRESS NOTES
Trauma surgery is aware that the patient was taking Ultram 50 mg every 8 hours at home prior to admission and it is okay for her to take the oxycodone 5 mg every 6 hours for 7 days as prescribed at discharge in addition to her home Ultram.    Electronically signed by Prachi Grimaldo DO on 1/3/2021 at 10:04 AM

## 2021-01-03 NOTE — PROGRESS NOTES
TRAUMA SURGERY  ATTENDING PROGRESS NOTE      CC: slip and fall     S:  Pain better today     O:   @/72   Pulse 96   Temp 97 °F (36.1 °C) (Temporal)   Resp 18   Ht 5' 7\" (1.702 m)   Wt 202 lb 9.6 oz (91.9 kg)   SpO2 94%   BMI 31.73 kg/m² @    Gen - no apparent distress   Neuro - Awake, alert, attentive    HEENT - PERRL 3mm conj pink   Lungs - non labored, BS clear b/l    Heart - RR no extra heart sounds    Abdomen - SNT   Spine -   Non tender C/T/L  Ext- rom wnl NVI     A/P: fall with multiple R sided rib fractures,       Active Problems:    Traumatic closed displaced fracture of multiple ribs  Resolved Problems:    * No resolved hospital problems. *       Status post fall  Multiple right-sided rib fractures--pain control (scheduled Tylenol/Robaxin/gabapentin; PRN oxycodone; ezpap  History of fibromyalgia/chronic back pain--continue pain meds  Hypoalbuminemia--diet as tolerated  PT/OT evals doing well plan possible d/c home tomorrow.    DVT risk--PCDs/lovenox    D/c today     Titi Rosales MD FACS

## 2021-01-03 NOTE — PLAN OF CARE
Problem: Pain:  Goal: Pain level will decrease  1/3/2021 0937 by Reanna Watts RN  Outcome: Met This Shift  1/3/2021 0240 by Tate Yeh RN  Outcome: Met This Shift  Goal: Control of acute pain  1/3/2021 0937 by Reanna Watts RN  Outcome: Met This Shift  1/3/2021 0240 by Tate Yeh RN  Outcome: Met This Shift  Goal: Control of chronic pain  Outcome: Met This Shift

## 2021-01-03 NOTE — PROGRESS NOTES
I was informed that the patient's home medication is in fact 100 mg Ultram every 8 hours daily.   Patient is all right to take oxycodone as prescribed, 5 mg PRN every 6 hrs daily for 7 days in addition to her home medication Ultram.    Electronically signed by Sulma De Souza DO on 1/3/2021 at 12:19 PM

## 2021-01-04 ENCOUNTER — TELEPHONE (OUTPATIENT)
Dept: SURGERY | Age: 51
End: 2021-01-04

## 2021-01-04 NOTE — TELEPHONE ENCOUNTER
MA received phone call from patient requesting to schedule appointment with trauma clinic post fall on 12/27/2020. Patient sustained R Rib Fx. Patient scheduled for first available on 1/12/2021 @ 2:15pm with trauma clinic. Patient informed of location and what to bring to appointment.      Electronically signed by Edmond Eugene on 1/4/21 at 10:18 AM EST

## 2021-01-05 ENCOUNTER — TELEPHONE (OUTPATIENT)
Dept: SURGERY | Age: 51
End: 2021-01-05

## 2021-01-05 DIAGNOSIS — M25.471 RIGHT ANKLE SWELLING: Primary | ICD-10-CM

## 2021-01-05 NOTE — TELEPHONE ENCOUNTER
MA received an incoming call from patient in regards to having right foot and ankle swelling. Patient stated it started yesterday. Pt denies any pain or discoloring to area. Pt elevated it this morning and notices slight difference but her son states no difference. Pt states she did sleep in the recliner last night with out the foot rest elevated so feet were hanging over chair all night. Patient was calling to notify the office of these changes and to see if she needed to do anything. MA advised would send a message and we would call her back once received a response. Pt verbalized understanding. Pt fell 12/27/20 on ice, sustained multiple rib fx. Pt F/U appt is 1/12/21. Pt can be reached at [phone]    MA routing message to Trauma NPs for advisement if any and ARACELI CARDONA for informational purposes.        Electronically signed by Will Isidro MA on 1/5/21 at 9:50 AM EST

## 2021-01-05 NOTE — TELEPHONE ENCOUNTER
Per Jorge Avilez CNP patient needs to have right ankle xray. Order placed. MA informed patient ankle xray was ordered. Patient states she is not going to have the xray. She states it is not painful it is just swollen and that she doesn't feel anything is broken. MA understood. Patient states she will be fine and will see us next week. MA explained the order is placed if she changes her mind and for her to call if anything changes. Patient understood.   Electronically signed by Lynn Villalobos on 1/5/21 at 3:32 PM EST

## 2021-01-12 ENCOUNTER — OFFICE VISIT (OUTPATIENT)
Dept: SURGERY | Age: 51
End: 2021-01-12
Payer: COMMERCIAL

## 2021-01-12 VITALS
SYSTOLIC BLOOD PRESSURE: 115 MMHG | OXYGEN SATURATION: 94 % | HEIGHT: 65 IN | TEMPERATURE: 97.8 F | HEART RATE: 77 BPM | DIASTOLIC BLOOD PRESSURE: 79 MMHG | BODY MASS INDEX: 32.99 KG/M2 | WEIGHT: 198 LBS | RESPIRATION RATE: 16 BRPM
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
## 2021-01-12 DIAGNOSIS — Z72.0 TOBACCO ABUSE: ICD-10-CM

## 2021-01-12 DIAGNOSIS — S22.41XD CLOSED FRACTURE OF MULTIPLE RIBS OF RIGHT SIDE WITH ROUTINE HEALING, SUBSEQUENT ENCOUNTER: ICD-10-CM

## 2021-01-12 PROCEDURE — 4004F PT TOBACCO SCREEN RCVD TLK: CPT | Performed by: NURSE PRACTITIONER

## 2021-01-12 PROCEDURE — 3017F COLORECTAL CA SCREEN DOC REV: CPT | Performed by: NURSE PRACTITIONER

## 2021-01-12 PROCEDURE — G8417 CALC BMI ABV UP PARAM F/U: HCPCS | Performed by: NURSE PRACTITIONER

## 2021-01-12 PROCEDURE — 99212 OFFICE O/P EST SF 10 MIN: CPT | Performed by: NURSE PRACTITIONER

## 2021-01-12 PROCEDURE — G8427 DOCREV CUR MEDS BY ELIG CLIN: HCPCS | Performed by: NURSE PRACTITIONER

## 2021-01-12 PROCEDURE — 1111F DSCHRG MED/CURRENT MED MERGE: CPT | Performed by: NURSE PRACTITIONER

## 2021-01-12 PROCEDURE — G8484 FLU IMMUNIZE NO ADMIN: HCPCS | Performed by: NURSE PRACTITIONER

## 2021-01-12 RX ORDER — METHOCARBAMOL 750 MG/1
750 TABLET, FILM COATED ORAL 4 TIMES DAILY
Qty: 40 TABLET | Refills: 1 | Status: SHIPPED | OUTPATIENT
Start: 2021-01-12 | End: 2021-01-22

## 2021-01-12 RX ORDER — ACETAMINOPHEN 500 MG
500 TABLET ORAL EVERY 4 HOURS PRN
Qty: 180 TABLET | Refills: 0 | Status: SHIPPED
Start: 2021-01-12 | End: 2022-04-18

## 2021-01-12 RX ORDER — DULOXETIN HYDROCHLORIDE 60 MG/1
CAPSULE, DELAYED RELEASE ORAL DAILY
COMMUNITY
Start: 2021-01-12

## 2021-01-12 RX ORDER — OXYCODONE HYDROCHLORIDE 5 MG/1
5 TABLET ORAL EVERY 8 HOURS PRN
Qty: 21 TABLET | Refills: 0 | Status: SHIPPED
Start: 2021-01-12 | End: 2021-01-19

## 2021-01-12 NOTE — PROGRESS NOTES
vitamin D (ERGOCALCIFEROL) 08224 units CAPS capsule Take 1 capsule by mouth once a week 1/19/18  Yes Jovana Doshi MD   omeprazole (PRILOSEC) 40 MG delayed release capsule take 1 capsule by mouth once daily 10/28/16  Yes VIANEY Spann CNP   PROAIR  (90 BASE) MCG/ACT inhaler Inhale 2 puffs into the lungs every 4 hours as needed  3/30/16  Yes Historical Provider, MD   Multiple Vitamins-Iron (MULTIVITAMIN/IRON PO) Take by mouth daily Indications: supplement   Yes Historical Provider, MD   vitamin B-12 (CYANOCOBALAMIN) 1000 MCG tablet Place 1,000 mcg under the tongue once a week Indications: Supplement   Yes Historical Provider, MD   calcium carbonate (OSCAL) 500 MG TABS tablet Take 500 mg by mouth 3 times daily Indications: Supplement   Yes Historical Provider, MD   traMADol (ULTRAM) 50 MG tablet Take 50 mg by mouth every 8 hours as needed  8/29/15  Yes Historical Provider, MD   DULoxetine (CYMBALTA) 60 MG extended release capsule  1/12/21   Historical Provider, MD          CC:  Trauma follow up  This 48year old woman presented to the clinic for follow up after sustaining a fall and fracturing right ribs 3-8. She was seen at an outlying hospital and transferred to [facility] for ongoing care. She was admitted to the hospital.  During the course of her stay, her pain was controlled by epidural pain medication. She was discharged home with lidocaine patch, oxycodone and robaxin for pain control. She has a history of fibromyalgia  She is on long term tramadol. She competed her oxycodone on Sunday she had been using her tramadol for pain control. She is under the care of a chronic pain management service. She denies any fever, cough or chills. She states she feels slightly better than her first day home from the hospital.  She lives in a 2 story home. Her shower is on the second floor. She is sleeping in a recliner. She is finding difficult to sleep at night. She is using her incentive spirometer & states she is getting 1000 ml. She admits to smoking cigarettes but too many. Her pain is the worse in the morning. She  is using the robaxin & tylenol. She is unable to take ibuprofen due to having gastric bypass surgery. She does admit slight SOB especially when she is ambulating. We discussed pain management. We discussed sing only the oxycodone not both the tramadol & oxycodone together. She denies any other issues besides rib pain. She is a massage therapist.      We had long conversation on pain management. She is aware not to take both tramadol & oxycodone together. We discussed sleep hygiene techniques. I encouraged her to stop smoking. Physical Exam  Education  Instructed to cough, deep breathe and use incentive spirometry 6-8 times per day. Instructed to increase activity. Instructed on opioid medications. Instructed on weaning of narcotics/pain medications. Controlled Substance Monitoring:    Acute and Chronic Pain Monitoring:   RX Monitoring 1/12/2021   Periodic Controlled Substance Monitoring No signs of potential drug abuse or diversion identified.      Assessment  Patient Active Problem List   Diagnosis Code    ADHD (attention deficit hyperactivity disorder) F90.9    Anxiety F41.9    GERD (gastroesophageal reflux disease) K21.9    Asthma J45.909    Carpal tunnel syndrome G56.00    Arthritis M19.90    Intestinal malabsorption K90.9    Deficiency of nutrient element E61.9    Shortness of breath R06.02    Tobacco abuse Z72.0    Hx of bariatric surgery Z98.84    Tobacco use disorder F17.200  Traumatic closed displaced fracture of multiple ribs S22.49XA     Plan  Return to clinic in 1 week. Oxycodone 5 mg every 8 hours as needed for pain. Robaxin 750 mg 1 tablet 4 times daily. Tylenol 500 mg every 4 hours as need for pain. NOTE: This report was transcribed using voice recognition software.  Every effort was made to ensure accuracy; however, inadvertent computerized transcription errors may be present

## 2021-01-12 NOTE — PATIENT INSTRUCTIONS
[address] [place] [address]  Trauma Services  Additional Discharge Instructions    Call [phone] for any questions/concerns. Please follow the instructions checked below:  Please follow up with your primary care provider. ACTIVITY INSTRUCTIONS  Increase activity as tolerated  No heavy lifting or strenuous activity  Take your incentive spirometer home and use 4-6 times/day   [x]  No driving until cleared by Trauma. WOUND/DRESSING INSTRUCTIONS:  You may shower. No sitting in bath tub, hot tub or swimming until cleared by physician. Ice to areas of pain for first 24 hours. Heat to areas of pain after that. MEDICATION INSTRUCTIONS  Take medication as prescribed. When taking pain medications, you may experience dizziness or drowsiness. Do not drink alcohol or drive when taking these medications. You may experience constipation while taking pain medication. You may take over the counter stool softeners such as docusate (Colace), sennosides S (Senokot-S), or Miralax. [x]  You may take acetaminophen (Tylenol) products if you are taking Percocet or Norco, as these contain Tylenol. --Do NOT take more than 3000mg of Tylenol in 24h. OPIOID MEDICATION INSTRUCTIONS  Read the medication guide that is included with your prescription. Take your medication exactly as prescribed. Store medication away from children and in a safe place. Do NOT share your medication with others. Do NOT take medication unless it is prescribed for you. Do NOT drink alcohol while taking opioids (I.e., Norco, Percocet, Oxycodone, etc). Discuss with the Trauma Clinic staff if the dose of medication you are taking does not control your pain and any side effects that you may be having. CALL 911 OR YOUR LOCAL EMERGENCY SERVICE:  --If you take too much medication  --If you have trouble breathing or shortness of breath  --A child has taken this medication.     WORK: You may not return to work until you receive follow-up with the Trauma. Call the trauma clinic for any of the following or for questions/concerns;  --fever over 101F  --redness, swelling, hardness or warmth at the wound site(s).   --Unrelieved nausea/vomiting  --Foul smelling or cloudy drainage at the wound site(s)  --Unrelieved pain or increase in pain  --Increase in shortness of breath    Follow-up:  Trauma Clinic: [phone] [address]  [facility], [address] [place]

## 2021-01-19 ENCOUNTER — OFFICE VISIT (OUTPATIENT)
Dept: SURGERY | Age: 51
End: 2021-01-19
Payer: COMMERCIAL

## 2021-01-19 VITALS
OXYGEN SATURATION: 97 % | SYSTOLIC BLOOD PRESSURE: 124 MMHG | TEMPERATURE: 96.8 F | HEART RATE: 87 BPM | RESPIRATION RATE: 16 BRPM | DIASTOLIC BLOOD PRESSURE: 76 MMHG | HEIGHT: 65 IN | WEIGHT: 198.4 LBS | BODY MASS INDEX: 33.05 KG/M2

## 2021-01-19 DIAGNOSIS — S22.49XA TRAUMATIC CLOSED DISPLACED FRACTURE OF MULTIPLE RIBS: Primary | ICD-10-CM

## 2021-01-19 PROCEDURE — 4004F PT TOBACCO SCREEN RCVD TLK: CPT | Performed by: NURSE PRACTITIONER

## 2021-01-19 PROCEDURE — 1111F DSCHRG MED/CURRENT MED MERGE: CPT | Performed by: NURSE PRACTITIONER

## 2021-01-19 PROCEDURE — 3017F COLORECTAL CA SCREEN DOC REV: CPT | Performed by: NURSE PRACTITIONER

## 2021-01-19 PROCEDURE — 99213 OFFICE O/P EST LOW 20 MIN: CPT | Performed by: NURSE PRACTITIONER

## 2021-01-19 PROCEDURE — G8427 DOCREV CUR MEDS BY ELIG CLIN: HCPCS | Performed by: NURSE PRACTITIONER

## 2021-01-19 PROCEDURE — G8417 CALC BMI ABV UP PARAM F/U: HCPCS | Performed by: NURSE PRACTITIONER

## 2021-01-19 PROCEDURE — G8484 FLU IMMUNIZE NO ADMIN: HCPCS | Performed by: NURSE PRACTITIONER

## 2021-01-19 RX ORDER — TRAMADOL HYDROCHLORIDE 50 MG/1
100 TABLET ORAL 3 TIMES DAILY
Qty: 48 TABLET | Refills: 0 | Status: SHIPPED | OUTPATIENT
Start: 2021-01-19 | End: 2021-01-27

## 2021-01-19 NOTE — PROGRESS NOTES
Trauma Clinic Progress Note   1/19/2021     Date of injury: 12/27    ARNULFO/Injuries:   Patient Active Problem List   Diagnosis Code    ADHD (attention deficit hyperactivity disorder) F90.9    Anxiety F41.9    GERD (gastroesophageal reflux disease) K21.9    Asthma J45.909    Carpal tunnel syndrome G56.00    Arthritis M19.90    Intestinal malabsorption K90.9    Deficiency of nutrient element E61.9    Shortness of breath R06.02    Tobacco abuse Z72.0    Hx of bariatric surgery Z98.84    Tobacco use disorder F17.200    Traumatic closed displaced fracture of multiple ribs S22.49XA       Surgeries:   Past Surgical History:   Procedure Laterality Date    CYST REMOVAL      right leg    DILATION AND CURETTAGE OF UTERUS  1991    HAND SURGERY Right 03/2016    [facility]: Dr. Karel Kwong     LIPECTOMY      JOSE-EN-Y GASTRIC BYPASS  12/1999    JOSE-EN-Y GASTRIC BYPASS  4/2012    repair of previous jose en y [address]  2003    UPPER GASTROINTESTINAL ENDOSCOPY         Vital signs:    /76   Pulse 87   Temp 96.8 °F (36 °C) (Infrared)   Resp 16   Ht 5' 5\" (1.651 m)   Wt 198 lb 6.4 oz (90 kg)   SpO2 97%   BMI 33.02 kg/m²     Medications:    Prior to Admission medications    Medication Sig Start Date End Date Taking? Authorizing Provider   traMADol (ULTRAM) 50 MG tablet Take 2 tablets by mouth 3 times daily for 8 days.  1/19/21 1/27/21 Yes VIANEY Guevara - CNP   DULoxetine (CYMBALTA) 60 MG extended release capsule Take by mouth daily  1/12/21  Yes Historical Provider, MD   acetaminophen (TYLENOL) 500 MG tablet Take 1 tablet by mouth every 4 hours as needed for Pain 1/12/21  Yes VIANEY Negro - CNS   methocarbamol (ROBAXIN-750) 750 MG tablet Take 1 tablet by mouth 4 times daily for 10 days 1/12/21 1/22/21 Yes VIANEY Negro   vitamin D (ERGOCALCIFEROL) 40142 units CAPS capsule Take 1 capsule by mouth once a week 1/19/18  Yes Ann Peralta MD omeprazole (PRILOSEC) 40 MG delayed release capsule take 1 capsule by mouth once daily 10/28/16  Yes Millie Bryant APRN - CNP   PROAIR  (90 BASE) MCG/ACT inhaler Inhale 2 puffs into the lungs every 4 hours as needed  3/30/16  Yes Historical Provider, MD   Multiple Vitamins-Iron (MULTIVITAMIN/IRON PO) Take by mouth daily Indications: supplement   Yes Historical Provider, MD   vitamin B-12 (CYANOCOBALAMIN) 1000 MCG tablet Place 1,000 mcg under the tongue once a week Indications: Supplement   Yes Historical Provider, MD   calcium carbonate (OSCAL) 500 MG TABS tablet Take 500 mg by mouth 3 times daily Indications: Supplement   Yes Historical Provider, MD          CC:  Trauma follow up Fall    Patient presents to the clinic today up on rib fractures she sustained during a fall. Since last visit patient states that she has not been taking the oxycodone as they \"really do not do anything for me\". States her pain is improving, although she still does find it difficult to get up and move around. Her appetite is fair, however this is baseline for her since her bypass surgery. Any shortness of breath, fever or chills. No cough. Has been up and moving around without difficulty. Physical Exam   Physical Exam   Constitutional: She is oriented to person, place, and time. She appears well-developed. HENT:   Head: Normocephalic. Eyes: Pupils are equal, round, and reactive to light. Neck: Normal range of motion. Neck supple. Cardiovascular: Normal rate and regular rhythm. Pulmonary/Chest: Effort normal and breath sounds normal. No respiratory distress. She has no wheezes. She has no rales. She exhibits no tenderness. Abdominal: Soft. Musculoskeletal: Normal range of motion. Neurological: She is oriented to person, place, and time. Skin: Skin is warm and dry. Controlled substances monitoring: possible medication side effects, risk of tolerance and/or dependence, and alternative treatments discussed, no signs of potential drug abuse or diversion identified and OARRS report reviewed today- activity consistent with treatment plan. 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 Very long conversation had with patient in regards to her pain and pain control. She states that she was no longer taking the oxycodone and did not finish her prescription, in fact patient did bring back 15 of her oxycodone pills in original prescription bottle for us to dispose. 15 pills were verified and disposed of with [address] as witness. She states she would like to go back on her Ultram as she was taking with her chronic pain management. She stated that she has an appointment with them on the 27th and would like to continue working the program with them. I did state that I would write 1 last prescription to get her to her chronic pain management appointment, however I will not normal trauma services write her any more prescriptions when she starts working with pain management. Patient verbalizes understanding and agrees to plan.     Plan  RTC 2 weeks for PT OT evaluation Orders reviewed and is concurrent with patient is stating all Ultram 100 mg 3 times daily prescription written only till the 27th when she sees chronic pain management. No further pain medications will be written  Oxycodone pills prescriptions were destroyed    Electronically signed by VIANEY Rios CNP on 1/19/2021 at 4:38 PM    NOTE: This report was transcribed using voice recognition software.  Every effort was made to ensure accuracy; however, inadvertent computerized transcription errors may be present

## 2021-02-02 ENCOUNTER — OFFICE VISIT (OUTPATIENT)
Dept: SURGERY | Age: 51
End: 2021-02-02
Payer: COMMERCIAL

## 2021-02-02 VITALS
HEIGHT: 65 IN | WEIGHT: 198 LBS | TEMPERATURE: 96.3 F | RESPIRATION RATE: 16 BRPM | BODY MASS INDEX: 32.99 KG/M2 | HEART RATE: 86 BPM | OXYGEN SATURATION: 100 % | SYSTOLIC BLOOD PRESSURE: 114 MMHG | DIASTOLIC BLOOD PRESSURE: 70 MMHG
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
## 2021-02-02 PROCEDURE — 1111F DSCHRG MED/CURRENT MED MERGE: CPT | Performed by: NURSE PRACTITIONER

## 2021-02-02 PROCEDURE — 3017F COLORECTAL CA SCREEN DOC REV: CPT | Performed by: NURSE PRACTITIONER

## 2021-02-02 PROCEDURE — 4004F PT TOBACCO SCREEN RCVD TLK: CPT | Performed by: NURSE PRACTITIONER

## 2021-02-02 PROCEDURE — G8484 FLU IMMUNIZE NO ADMIN: HCPCS | Performed by: NURSE PRACTITIONER

## 2021-02-02 PROCEDURE — 99212 OFFICE O/P EST SF 10 MIN: CPT | Performed by: NURSE PRACTITIONER

## 2021-02-02 PROCEDURE — G8427 DOCREV CUR MEDS BY ELIG CLIN: HCPCS | Performed by: NURSE PRACTITIONER

## 2021-02-02 PROCEDURE — G8417 CALC BMI ABV UP PARAM F/U: HCPCS | Performed by: NURSE PRACTITIONER

## 2021-02-02 NOTE — LETTER
[facility]   [address] [facility], [facility] [phone] (Fax)        2/2/2021    Patient: Dilia Smith   YOB: 1970           To Whom it May Concern:     Dilia Smith was seen in my clinic on 2/2/2021. She may return to work without restrictions on Monday February 8, 2021. If you have any questions or concerns, please don't hesitate to call.     Sincerely,         Thresa Bernheim RN MSN CCRN CCNS  APRN-BC-NP

## 2021-02-02 NOTE — PROGRESS NOTES
Trauma Clinic Progress Note   2/2/2021     Date of injury: December 27, 2020         ARNULFO/Injuries:   Patient Active Problem List   Diagnosis Code    ADHD (attention deficit hyperactivity disorder) F90.9    Anxiety F41.9    GERD (gastroesophageal reflux disease) K21.9    Asthma J45.909    Carpal tunnel syndrome G56.00    Arthritis M19.90    Intestinal malabsorption K90.9    Deficiency of nutrient element E61.9    Shortness of breath R06.02    Tobacco abuse Z72.0    Hx of bariatric surgery Z98.84    Tobacco use disorder F17.200    Traumatic closed displaced fracture of multiple ribs S22.49XA       Surgeries:   Past Surgical History:   Procedure Laterality Date    CYST REMOVAL      right leg    DILATION AND CURETTAGE OF UTERUS  1991    HAND SURGERY Right 03/2016    [facility]: Dr. Amada Aguero     LIPECTOMY      JOSE-EN-Y GASTRIC BYPASS  12/1999    JOSE-EN-Y GASTRIC BYPASS  4/2012    repair of previous jose en y [address]  2003    UPPER GASTROINTESTINAL ENDOSCOPY         Vital signs:    /70   Pulse 86   Temp 96.3 °F (35.7 °C) (Infrared)   Resp 16   Ht 5' 5\" (1.651 m)   Wt 198 lb (89.8 kg)   SpO2 100%   BMI 32.95 kg/m²     Medications:    Prior to Admission medications    Medication Sig Start Date End Date Taking?  Authorizing Provider   DULoxetine (CYMBALTA) 60 MG extended release capsule Take by mouth daily  1/12/21  Yes Historical Provider, MD   acetaminophen (TYLENOL) 500 MG tablet Take 1 tablet by mouth every 4 hours as needed for Pain 1/12/21  Yes VIANEY Castellano - CNS   vitamin D (ERGOCALCIFEROL) 41731 units CAPS capsule Take 1 capsule by mouth once a week 1/19/18  Yes Dayana Landin MD   omeprazole (PRILOSEC) 40 MG delayed release capsule take 1 capsule by mouth once daily 10/28/16  Yes VIANEY Agrawal - CNP PROAIR  (90 BASE) MCG/ACT inhaler Inhale 2 puffs into the lungs every 4 hours as needed  3/30/16  Yes Historical Provider, MD   Multiple Vitamins-Iron (MULTIVITAMIN/IRON PO) Take by mouth daily Indications: supplement   Yes Historical Provider, MD   vitamin B-12 (CYANOCOBALAMIN) 1000 MCG tablet Place 1,000 mcg under the tongue once a week Indications: Supplement   Yes Historical Provider, MD   calcium carbonate (OSCAL) 500 MG TABS tablet Take 500 mg by mouth 3 times daily Indications: Supplement   Yes Historical Provider, MD          CC:  Trauma follow up    This 48year-old resents to the trauma clinic for follow-up after sustaining rib fractures after falling. She has continued to use her tramadol prescribed by her pain management. She has slight pain with her ribs with coughing and sneezing at times. She feels much better than she did a few weeks ago. She denies any fever or chills. She is continuing to sleep in a recliner. She is requesting to go back to work on Monday, February 8, 2021    He is able to wear a bra today. He states that she feels tissue in her anterior chest is not \" normal.\"      Physical Exam  Physical Exam  Vitals signs reviewed. HENT:      Head: Normocephalic. Neck:      Musculoskeletal: Normal range of motion. Cardiovascular:      Rate and Rhythm: Normal rate and regular rhythm. Pulses: Normal pulses. Heart sounds: Normal heart sounds. Pulmonary:      Effort: Pulmonary effort is normal.      Breath sounds: Normal breath sounds. Chest:          Comments: Area of roundness palpated between 10 and 12 o'clock position area is immobile. Tender to palpation. Abdominal:      General: Abdomen is flat. Palpations: Abdomen is soft. Musculoskeletal: Normal range of motion. Skin:     Capillary Refill: Capillary refill takes less than 2 seconds. Neurological:      General: No focal deficit present. Mental Status: She is alert and oriented to person, place, and time. Psychiatric:         Mood and Affect: Mood normal.           Education  Directed to increase increase activity. 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 Follow up with your pain management service. Return to work on Monday February 8, 2021 without restrictions. NOTE: This report was transcribed using voice recognition software.  Every effort was made to ensure accuracy; however, inadvertent computerized transcription errors may be present